# Patient Record
Sex: MALE | Race: WHITE | Employment: OTHER | URBAN - METROPOLITAN AREA
[De-identification: names, ages, dates, MRNs, and addresses within clinical notes are randomized per-mention and may not be internally consistent; named-entity substitution may affect disease eponyms.]

---

## 2016-12-01 PROCEDURE — G9678 ONCOLOGY CARE MODEL SERVICE: HCPCS | Performed by: INTERNAL MEDICINE

## 2017-01-01 PROCEDURE — G9678 ONCOLOGY CARE MODEL SERVICE: HCPCS | Performed by: INTERNAL MEDICINE

## 2017-01-17 ENCOUNTER — SNF/IP PROF CHARGE ONLY (OUTPATIENT)
Dept: HEMATOLOGY/ONCOLOGY | Facility: HOSPITAL | Age: 82
End: 2017-01-17

## 2017-01-17 DIAGNOSIS — Z09 CHEMOTHERAPY FOLLOW-UP EXAMINATION: Primary | ICD-10-CM

## 2017-01-25 ENCOUNTER — ANTI-COAG VISIT (OUTPATIENT)
Dept: INTERNAL MEDICINE CLINIC | Facility: CLINIC | Age: 82
End: 2017-01-25

## 2017-01-25 DIAGNOSIS — I48.91 ATRIAL FIBRILLATION, UNSPECIFIED TYPE (HCC): Primary | ICD-10-CM

## 2017-01-25 LAB — INR: 2 (ref 0.8–1.2)

## 2017-01-25 PROCEDURE — 36416 COLLJ CAPILLARY BLOOD SPEC: CPT

## 2017-01-25 PROCEDURE — 85610 PROTHROMBIN TIME: CPT

## 2017-01-25 PROCEDURE — G0463 HOSPITAL OUTPT CLINIC VISIT: HCPCS

## 2017-02-23 ENCOUNTER — ANTI-COAG VISIT (OUTPATIENT)
Dept: INTERNAL MEDICINE CLINIC | Facility: CLINIC | Age: 82
End: 2017-02-23

## 2017-02-23 DIAGNOSIS — I48.91 ATRIAL FIBRILLATION, UNSPECIFIED TYPE (HCC): Primary | ICD-10-CM

## 2017-02-23 LAB — INR: 1.6 (ref 0.8–1.2)

## 2017-02-23 PROCEDURE — 36416 COLLJ CAPILLARY BLOOD SPEC: CPT

## 2017-02-23 PROCEDURE — 85610 PROTHROMBIN TIME: CPT

## 2017-02-23 PROCEDURE — G0463 HOSPITAL OUTPT CLINIC VISIT: HCPCS

## 2017-03-02 ENCOUNTER — ANTI-COAG VISIT (OUTPATIENT)
Dept: INTERNAL MEDICINE CLINIC | Facility: CLINIC | Age: 82
End: 2017-03-02

## 2017-03-02 DIAGNOSIS — I48.91 ATRIAL FIBRILLATION, UNSPECIFIED TYPE (HCC): Primary | ICD-10-CM

## 2017-03-02 LAB — INR: 2.1 (ref 0.8–1.2)

## 2017-03-02 PROCEDURE — 85610 PROTHROMBIN TIME: CPT

## 2017-03-02 PROCEDURE — G0463 HOSPITAL OUTPT CLINIC VISIT: HCPCS

## 2017-03-02 PROCEDURE — 36416 COLLJ CAPILLARY BLOOD SPEC: CPT

## 2017-03-14 ENCOUNTER — OFFICE VISIT (OUTPATIENT)
Dept: OTOLARYNGOLOGY | Facility: CLINIC | Age: 82
End: 2017-03-14

## 2017-03-14 VITALS
DIASTOLIC BLOOD PRESSURE: 68 MMHG | TEMPERATURE: 97 F | BODY MASS INDEX: 24.38 KG/M2 | SYSTOLIC BLOOD PRESSURE: 123 MMHG | WEIGHT: 180 LBS | HEIGHT: 72 IN

## 2017-03-14 DIAGNOSIS — H61.23 BILATERAL IMPACTED CERUMEN: Primary | ICD-10-CM

## 2017-03-14 PROCEDURE — 69210 REMOVE IMPACTED EAR WAX UNI: CPT | Performed by: OTOLARYNGOLOGY

## 2017-03-14 PROCEDURE — 99203 OFFICE O/P NEW LOW 30 MIN: CPT | Performed by: OTOLARYNGOLOGY

## 2017-03-14 NOTE — PROGRESS NOTES
Malou Benson is a 80year old male. Patient presents with:  Ear Wax: both ears    HPI:   His hearing has been diminished for some time. He and his wife feel that is slowly getting worse.  He reports that he has a large amount of wax and his sugars frequent fever, chills, sweats, weight loss, weight gain  SKIN: denies any unusual skin lesions or rashes  RESPIRATORY: denies shortness of breath with exertion  NEURO: denies headaches    EXAM:   /68 mmHg  Temp(Src) 97 °F (36.1 °C) (Tympanic)  Ht 6' (1.829 m

## 2017-03-16 ENCOUNTER — ANTI-COAG VISIT (OUTPATIENT)
Dept: INTERNAL MEDICINE CLINIC | Facility: CLINIC | Age: 82
End: 2017-03-16

## 2017-03-16 DIAGNOSIS — I48.91 ATRIAL FIBRILLATION, UNSPECIFIED TYPE (HCC): Primary | ICD-10-CM

## 2017-03-16 LAB — INR: 2.3 (ref 0.8–1.2)

## 2017-03-16 PROCEDURE — 36416 COLLJ CAPILLARY BLOOD SPEC: CPT

## 2017-03-16 PROCEDURE — 85610 PROTHROMBIN TIME: CPT

## 2017-03-16 PROCEDURE — G0463 HOSPITAL OUTPT CLINIC VISIT: HCPCS

## 2017-03-20 ENCOUNTER — SNF/IP PROF CHARGE ONLY (OUTPATIENT)
Dept: HEMATOLOGY/ONCOLOGY | Facility: HOSPITAL | Age: 82
End: 2017-03-20

## 2017-03-20 DIAGNOSIS — Z09 CHEMOTHERAPY FOLLOW-UP EXAMINATION: Primary | ICD-10-CM

## 2017-04-11 ENCOUNTER — ANTI-COAG VISIT (OUTPATIENT)
Dept: INTERNAL MEDICINE CLINIC | Facility: CLINIC | Age: 82
End: 2017-04-11

## 2017-04-11 DIAGNOSIS — I48.91 ATRIAL FIBRILLATION, UNSPECIFIED TYPE (HCC): Primary | ICD-10-CM

## 2017-04-11 PROCEDURE — G0463 HOSPITAL OUTPT CLINIC VISIT: HCPCS

## 2017-04-11 PROCEDURE — 85610 PROTHROMBIN TIME: CPT

## 2017-04-11 PROCEDURE — 36416 COLLJ CAPILLARY BLOOD SPEC: CPT

## 2017-04-18 ENCOUNTER — LAB REQUISITION (OUTPATIENT)
Dept: LAB | Facility: HOSPITAL | Age: 82
End: 2017-04-18
Payer: MEDICARE

## 2017-04-18 DIAGNOSIS — Z01.89 ENCOUNTER FOR OTHER SPECIFIED SPECIAL EXAMINATIONS: ICD-10-CM

## 2017-04-18 PROCEDURE — 88305 TISSUE EXAM BY PATHOLOGIST: CPT | Performed by: SURGERY

## 2017-05-01 ENCOUNTER — OFFICE VISIT (OUTPATIENT)
Dept: INTERNAL MEDICINE CLINIC | Facility: CLINIC | Age: 82
End: 2017-05-01

## 2017-05-01 VITALS
WEIGHT: 192 LBS | DIASTOLIC BLOOD PRESSURE: 76 MMHG | BODY MASS INDEX: 26 KG/M2 | HEART RATE: 68 BPM | TEMPERATURE: 98 F | SYSTOLIC BLOOD PRESSURE: 120 MMHG

## 2017-05-01 DIAGNOSIS — C20 RECTAL CANCER (HCC): ICD-10-CM

## 2017-05-01 DIAGNOSIS — I26.99 OTHER PULMONARY EMBOLISM WITHOUT ACUTE COR PULMONALE, UNSPECIFIED CHRONICITY (HCC): ICD-10-CM

## 2017-05-01 DIAGNOSIS — N18.3 CKD (CHRONIC KIDNEY DISEASE), STAGE 3 (MODERATE): ICD-10-CM

## 2017-05-01 DIAGNOSIS — D50.8 OTHER IRON DEFICIENCY ANEMIA: ICD-10-CM

## 2017-05-01 DIAGNOSIS — R05.9 COUGH: Primary | ICD-10-CM

## 2017-05-01 PROCEDURE — 99214 OFFICE O/P EST MOD 30 MIN: CPT | Performed by: INTERNAL MEDICINE

## 2017-05-01 PROCEDURE — G0463 HOSPITAL OUTPT CLINIC VISIT: HCPCS | Performed by: INTERNAL MEDICINE

## 2017-05-01 RX ORDER — AZITHROMYCIN 250 MG/1
TABLET, FILM COATED ORAL
Qty: 6 TABLET | Refills: 0 | Status: SHIPPED | OUTPATIENT
Start: 2017-05-01 | End: 2017-06-22

## 2017-05-01 NOTE — PROGRESS NOTES
Allie Smith is a 80year old male.     HPI:   Patient presents with:  Cough: has had for about a week      81 y/o M with one week h/o +cough; no sputum; denies sinus or nasal congestion; denies post-nasal drip; no SOB; no F/C      HISTORY:  Past Medical H Take 1 tablet by mouth twice daily Disp: 1 capsule Rfl: 0       Allergies:  No Known Allergies              ROS:   Constitutional: no weight loss; no fatigue  Cardiovascular:  Negative for chest pain; negative palpitations  Respiratory:  Negative for cough later this month                   Orders This Visit:  No orders of the defined types were placed in this encounter.        Meds This Visit:    No prescriptions requested or ordered in this encounter    Imaging & Referrals:  None     5/1/2017  Sancho Borjas

## 2017-05-09 ENCOUNTER — ANTI-COAG VISIT (OUTPATIENT)
Dept: INTERNAL MEDICINE CLINIC | Facility: CLINIC | Age: 82
End: 2017-05-09

## 2017-05-09 DIAGNOSIS — I48.91 ATRIAL FIBRILLATION, UNSPECIFIED TYPE (HCC): Primary | ICD-10-CM

## 2017-05-09 PROCEDURE — G0463 HOSPITAL OUTPT CLINIC VISIT: HCPCS

## 2017-05-09 PROCEDURE — 85610 PROTHROMBIN TIME: CPT

## 2017-05-09 PROCEDURE — 36416 COLLJ CAPILLARY BLOOD SPEC: CPT

## 2017-05-30 ENCOUNTER — ANTI-COAG VISIT (OUTPATIENT)
Dept: INTERNAL MEDICINE CLINIC | Facility: CLINIC | Age: 82
End: 2017-05-30

## 2017-05-30 DIAGNOSIS — I48.91 ATRIAL FIBRILLATION, UNSPECIFIED TYPE (HCC): Primary | ICD-10-CM

## 2017-05-30 PROCEDURE — 36416 COLLJ CAPILLARY BLOOD SPEC: CPT

## 2017-05-30 PROCEDURE — G0463 HOSPITAL OUTPT CLINIC VISIT: HCPCS

## 2017-05-30 PROCEDURE — 85610 PROTHROMBIN TIME: CPT

## 2017-06-22 RX ORDER — WARFARIN SODIUM 2.5 MG/1
TABLET ORAL
Qty: 135 TABLET | Refills: 1 | Status: ON HOLD | OUTPATIENT
Start: 2017-06-22 | End: 2017-12-25

## 2017-07-07 ENCOUNTER — ANTI-COAG VISIT (OUTPATIENT)
Dept: INTERNAL MEDICINE CLINIC | Facility: CLINIC | Age: 82
End: 2017-07-07

## 2017-07-07 DIAGNOSIS — I48.91 ATRIAL FIBRILLATION, UNSPECIFIED TYPE (HCC): ICD-10-CM

## 2017-07-07 LAB
INR: 2.4 (ref 0.8–1.2)
TEST STRIP EXPIRATION DATE: ABNORMAL DATE

## 2017-07-07 PROCEDURE — G0463 HOSPITAL OUTPT CLINIC VISIT: HCPCS

## 2017-07-07 PROCEDURE — 85610 PROTHROMBIN TIME: CPT

## 2017-07-07 PROCEDURE — 36416 COLLJ CAPILLARY BLOOD SPEC: CPT

## 2017-08-02 ENCOUNTER — ANTI-COAG VISIT (OUTPATIENT)
Dept: INTERNAL MEDICINE CLINIC | Facility: CLINIC | Age: 82
End: 2017-08-02

## 2017-08-02 DIAGNOSIS — I48.91 ATRIAL FIBRILLATION, UNSPECIFIED TYPE (HCC): ICD-10-CM

## 2017-08-02 LAB
INR: 3.1 (ref 0.8–1.2)
TEST STRIP EXPIRATION DATE: ABNORMAL DATE

## 2017-08-02 PROCEDURE — 99211 OFF/OP EST MAY X REQ PHY/QHP: CPT | Performed by: INTERNAL MEDICINE

## 2017-08-02 PROCEDURE — 36416 COLLJ CAPILLARY BLOOD SPEC: CPT | Performed by: INTERNAL MEDICINE

## 2017-08-02 PROCEDURE — 85610 PROTHROMBIN TIME: CPT | Performed by: INTERNAL MEDICINE

## 2017-08-03 ENCOUNTER — ANTI-COAG VISIT (OUTPATIENT)
Dept: INTERNAL MEDICINE CLINIC | Facility: CLINIC | Age: 82
End: 2017-08-03

## 2017-08-03 DIAGNOSIS — I48.91 ATRIAL FIBRILLATION, UNSPECIFIED TYPE (HCC): ICD-10-CM

## 2017-08-03 LAB
INR: 2.5 (ref 0.8–1.2)
TEST STRIP EXPIRATION DATE: ABNORMAL DATE

## 2017-08-03 PROCEDURE — 99211 OFF/OP EST MAY X REQ PHY/QHP: CPT | Performed by: INTERNAL MEDICINE

## 2017-08-03 PROCEDURE — 85610 PROTHROMBIN TIME: CPT | Performed by: INTERNAL MEDICINE

## 2017-08-03 PROCEDURE — 36416 COLLJ CAPILLARY BLOOD SPEC: CPT | Performed by: INTERNAL MEDICINE

## 2017-08-16 ENCOUNTER — ANTI-COAG VISIT (OUTPATIENT)
Dept: INTERNAL MEDICINE CLINIC | Facility: CLINIC | Age: 82
End: 2017-08-16

## 2017-08-16 DIAGNOSIS — I48.91 ATRIAL FIBRILLATION, UNSPECIFIED TYPE (HCC): ICD-10-CM

## 2017-08-16 LAB
INR: 1.9 (ref 0.8–1.2)
TEST STRIP EXPIRATION DATE: ABNORMAL DATE

## 2017-08-16 PROCEDURE — 85610 PROTHROMBIN TIME: CPT | Performed by: INTERNAL MEDICINE

## 2017-08-16 PROCEDURE — 99211 OFF/OP EST MAY X REQ PHY/QHP: CPT | Performed by: INTERNAL MEDICINE

## 2017-08-16 PROCEDURE — 36416 COLLJ CAPILLARY BLOOD SPEC: CPT | Performed by: INTERNAL MEDICINE

## 2017-09-06 ENCOUNTER — ANTI-COAG VISIT (OUTPATIENT)
Dept: INTERNAL MEDICINE CLINIC | Facility: CLINIC | Age: 82
End: 2017-09-06

## 2017-09-06 DIAGNOSIS — I48.91 ATRIAL FIBRILLATION, UNSPECIFIED TYPE (HCC): ICD-10-CM

## 2017-09-06 LAB
INR: 2.8 (ref 0.8–1.2)
TEST STRIP EXPIRATION DATE: ABNORMAL DATE

## 2017-09-06 PROCEDURE — 85610 PROTHROMBIN TIME: CPT

## 2017-09-06 PROCEDURE — 36416 COLLJ CAPILLARY BLOOD SPEC: CPT

## 2017-09-06 PROCEDURE — G0463 HOSPITAL OUTPT CLINIC VISIT: HCPCS

## 2017-10-02 ENCOUNTER — OFFICE VISIT (OUTPATIENT)
Dept: INTERNAL MEDICINE CLINIC | Facility: CLINIC | Age: 82
End: 2017-10-02

## 2017-10-02 VITALS
WEIGHT: 191 LBS | DIASTOLIC BLOOD PRESSURE: 72 MMHG | TEMPERATURE: 98 F | SYSTOLIC BLOOD PRESSURE: 114 MMHG | HEART RATE: 70 BPM | OXYGEN SATURATION: 94 % | HEIGHT: 72 IN | BODY MASS INDEX: 25.87 KG/M2

## 2017-10-02 DIAGNOSIS — C20 RECTAL CANCER (HCC): ICD-10-CM

## 2017-10-02 DIAGNOSIS — D50.8 OTHER IRON DEFICIENCY ANEMIA: ICD-10-CM

## 2017-10-02 DIAGNOSIS — I26.99 OTHER PULMONARY EMBOLISM WITHOUT ACUTE COR PULMONALE, UNSPECIFIED CHRONICITY (HCC): ICD-10-CM

## 2017-10-02 DIAGNOSIS — M10.9 ACUTE GOUT OF LEFT ANKLE, UNSPECIFIED CAUSE: Primary | ICD-10-CM

## 2017-10-02 PROCEDURE — G0463 HOSPITAL OUTPT CLINIC VISIT: HCPCS | Performed by: INTERNAL MEDICINE

## 2017-10-02 PROCEDURE — 99214 OFFICE O/P EST MOD 30 MIN: CPT | Performed by: INTERNAL MEDICINE

## 2017-10-02 RX ORDER — PREDNISONE 10 MG/1
TABLET ORAL
Qty: 24 TABLET | Refills: 0 | Status: SHIPPED | OUTPATIENT
Start: 2017-10-02 | End: 2017-12-24 | Stop reason: ALTCHOICE

## 2017-10-02 NOTE — PROGRESS NOTES
Kyleigh Mcmahan is a 80year old male.     HPI:   Patient presents with:  Checkup: Swollen left ankle, started 2 days ago, Non-pitting      79 y/o M with 3 d h/o pain and swelling to left ankle; no trauma; no F/C; no CP; no SOB      HISTORY:  Past Medical His Disp:  Rfl:    Coenzyme Q10 (CO Q 10) 10 MG Oral Cap Take 1 tablet by mouth daily Disp: 1 capsule Rfl: 0   Multiple Vitamins-Minerals (OCUVITE EYE HEALTH FORMULA) Oral Cap Take 1 tablet by mouth twice daily Disp: 1 capsule Rfl: 0       Allergies:  No Known disease-- creat improved to 1.40.  7.  Anemia -- due in part to acute blood loss.  Hgb stable at 11.2 on 9/6/16  8.  Macular degeneration.  The patient takes PreserVision daily and is due to ocular injection by ophthalmologist Dr. Jonathan Quiroz later this month

## 2017-10-04 ENCOUNTER — ANTI-COAG VISIT (OUTPATIENT)
Dept: INTERNAL MEDICINE CLINIC | Facility: CLINIC | Age: 82
End: 2017-10-04

## 2017-10-04 DIAGNOSIS — I48.91 ATRIAL FIBRILLATION, UNSPECIFIED TYPE (HCC): ICD-10-CM

## 2017-10-04 PROCEDURE — 36416 COLLJ CAPILLARY BLOOD SPEC: CPT

## 2017-10-04 PROCEDURE — G0463 HOSPITAL OUTPT CLINIC VISIT: HCPCS

## 2017-10-04 PROCEDURE — 85610 PROTHROMBIN TIME: CPT

## 2017-10-13 ENCOUNTER — ANTI-COAG VISIT (OUTPATIENT)
Dept: INTERNAL MEDICINE CLINIC | Facility: CLINIC | Age: 82
End: 2017-10-13

## 2017-10-13 DIAGNOSIS — I48.91 ATRIAL FIBRILLATION, UNSPECIFIED TYPE (HCC): ICD-10-CM

## 2017-10-13 PROCEDURE — 36416 COLLJ CAPILLARY BLOOD SPEC: CPT | Performed by: INTERNAL MEDICINE

## 2017-10-13 PROCEDURE — 85610 PROTHROMBIN TIME: CPT | Performed by: INTERNAL MEDICINE

## 2017-10-13 PROCEDURE — G0463 HOSPITAL OUTPT CLINIC VISIT: HCPCS | Performed by: INTERNAL MEDICINE

## 2017-11-02 ENCOUNTER — ANTI-COAG VISIT (OUTPATIENT)
Dept: INTERNAL MEDICINE CLINIC | Facility: CLINIC | Age: 82
End: 2017-11-02

## 2017-11-02 DIAGNOSIS — I48.91 ATRIAL FIBRILLATION, UNSPECIFIED TYPE (HCC): ICD-10-CM

## 2017-11-02 PROCEDURE — G0463 HOSPITAL OUTPT CLINIC VISIT: HCPCS

## 2017-11-02 PROCEDURE — 36416 COLLJ CAPILLARY BLOOD SPEC: CPT

## 2017-11-02 PROCEDURE — 85610 PROTHROMBIN TIME: CPT

## 2017-11-10 ENCOUNTER — ANTI-COAG VISIT (OUTPATIENT)
Dept: INTERNAL MEDICINE CLINIC | Facility: CLINIC | Age: 82
End: 2017-11-10

## 2017-11-10 DIAGNOSIS — I48.91 ATRIAL FIBRILLATION, UNSPECIFIED TYPE (HCC): ICD-10-CM

## 2017-11-10 PROCEDURE — G0463 HOSPITAL OUTPT CLINIC VISIT: HCPCS

## 2017-11-10 PROCEDURE — 36416 COLLJ CAPILLARY BLOOD SPEC: CPT

## 2017-11-10 PROCEDURE — 85610 PROTHROMBIN TIME: CPT

## 2017-11-27 ENCOUNTER — ANTI-COAG VISIT (OUTPATIENT)
Dept: INTERNAL MEDICINE CLINIC | Facility: CLINIC | Age: 82
End: 2017-11-27

## 2017-11-27 DIAGNOSIS — I48.91 ATRIAL FIBRILLATION, UNSPECIFIED TYPE (HCC): ICD-10-CM

## 2017-11-27 PROCEDURE — 85610 PROTHROMBIN TIME: CPT

## 2017-11-27 PROCEDURE — 36416 COLLJ CAPILLARY BLOOD SPEC: CPT

## 2017-12-24 ENCOUNTER — HOSPITAL ENCOUNTER (INPATIENT)
Facility: HOSPITAL | Age: 82
LOS: 1 days | Discharge: HOME | DRG: 307 | End: 2017-12-25
Attending: EMERGENCY MEDICINE | Admitting: INTERNAL MEDICINE
Payer: MEDICARE

## 2017-12-24 ENCOUNTER — APPOINTMENT (OUTPATIENT)
Dept: GENERAL RADIOLOGY | Facility: HOSPITAL | Age: 82
DRG: 307 | End: 2017-12-24
Attending: EMERGENCY MEDICINE
Payer: MEDICARE

## 2017-12-24 ENCOUNTER — APPOINTMENT (OUTPATIENT)
Dept: CT IMAGING | Facility: HOSPITAL | Age: 82
DRG: 307 | End: 2017-12-24
Attending: EMERGENCY MEDICINE
Payer: MEDICARE

## 2017-12-24 DIAGNOSIS — R06.00 DYSPNEA ON EXERTION: Primary | ICD-10-CM

## 2017-12-24 DIAGNOSIS — R77.8 ELEVATED TROPONIN I LEVEL: ICD-10-CM

## 2017-12-24 PROBLEM — R06.09 DYSPNEA ON EXERTION: Status: ACTIVE | Noted: 2017-12-24

## 2017-12-24 PROBLEM — N18.30 CKD (CHRONIC KIDNEY DISEASE), STAGE III (HCC): Status: ACTIVE | Noted: 2017-12-24

## 2017-12-24 PROBLEM — R79.89 ELEVATED TROPONIN I LEVEL: Status: ACTIVE | Noted: 2017-12-24

## 2017-12-24 PROCEDURE — 71010 XR CHEST AP PORTABLE  (CPT=71010): CPT | Performed by: EMERGENCY MEDICINE

## 2017-12-24 PROCEDURE — 99223 1ST HOSP IP/OBS HIGH 75: CPT | Performed by: INTERNAL MEDICINE

## 2017-12-24 PROCEDURE — 70450 CT HEAD/BRAIN W/O DYE: CPT | Performed by: EMERGENCY MEDICINE

## 2017-12-24 RX ORDER — VITS A,C,E/LUTEIN/MINERALS 300MCG-200
1 TABLET ORAL 2 TIMES DAILY
Status: DISCONTINUED | OUTPATIENT
Start: 2017-12-24 | End: 2017-12-25

## 2017-12-24 RX ORDER — CHOLECALCIFEROL (VITAMIN D3) 125 MCG
1000 CAPSULE ORAL DAILY
Status: DISCONTINUED | OUTPATIENT
Start: 2017-12-24 | End: 2017-12-25

## 2017-12-24 RX ORDER — ASCORBIC ACID 1000 MG
10 TABLET ORAL DAILY
Status: DISCONTINUED | OUTPATIENT
Start: 2017-12-24 | End: 2017-12-24 | Stop reason: RX

## 2017-12-24 RX ORDER — VIT A/VIT C/VIT E/ZINC/COPPER 2148-113
1 TABLET ORAL 2 TIMES DAILY
COMMUNITY

## 2017-12-24 NOTE — PROGRESS NOTES
[facility] Progress Note:  Automatic Substitution    The order for Preservision AREDS has been changed to Ocuvite per the automatic substitution policy approved by the P&T committee.   Thank you,  Haritha See, PharmD  12/24/20

## 2017-12-24 NOTE — CONSULTS
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 Peripheral pulses are 2+. Neurologic: Alert and oriented, normal affect. Skin: Warm and dry.      Laboratory Data:    Lab Results  Component Value Date   WBC 9.3 12/24/2017   HGB 14.1 12/24/2017   HCT 42.8 12/24/2017   PLT 14 Recommendations:  Problem #1 dyspnea on exertion. Unclear etiology does not appear to be in congestive heart failure on the exam.  No need for diuretics at this point.   Does have what appears to be aortic stenosis murmur on exam which might be contrib

## 2017-12-24 NOTE — ED NOTES
Pt to ED per fall 2 days ago, pt slid down stairs hitting his lower back on the stairs and then hitting back of head on stairs. Pt denies abd pain/nausea but vomited this AM after taking coumadin. Pt then took another tab, now requesting an INR check.   Pt

## 2017-12-24 NOTE — ED INITIAL ASSESSMENT (HPI)
Pt fell down the stairs 2 days ago and yesterday he vomited after taking his coumadin. Pt's wife gave him extra coumadin, he is here to get his INR checked.

## 2017-12-24 NOTE — PROGRESS NOTES
Pharmacy Note: Herbal/OTC Supplement Discontinuation Per Policy    Co Q 10 & Total memory & Focus formula has been discontinued on [place] per policy. These supplements may be restarted upon discharge using the medication reconciliation process.     Fer Jose

## 2017-12-24 NOTE — HISTORICAL OFFICE NOTE
BRADEN ULLOA  [phone]  : 1926  ACCOUNT: [id number]  CARDIOLOGIST: Ángel Baker M.D.    Hospital: [facility]  Admitted: 2014  Discharged: 12/10/2014    Joana COURSE: The patient came in on 2014 for

## 2017-12-24 NOTE — ED PROVIDER NOTES
Patient Seen in: [facility] AND [facility] Emergency Department    History   Patient presents with:  Fall (musculoskeletal, neurologic)    Stated Complaint:     HPI    49-year-old male with history of prior DVT and PE presently taking Coumadin, chronic kidney di COLONOSCOPY  03-: ELECTROCARDIOGRAM, COMPLETE      Comment: Scanned to media tab - DOS 03-  1976: HERNIA SURGERY  2003: KNEE REPLACEMENT SURGERY Bilateral        Smoking status: Never Smoker following:        Result Value    PT 36.3 (*)     INR 3.7 (*)     All other components within normal limits   BASIC METABOLIC PANEL (8) - Abnormal; Notable for the following:     Glucose 133 (*)     BUN 34 (*)     Creatinine 1.69 (*)     BUN/CREA Ratio 20. complaints during his ED stay. He denies any symptoms while at rest.  Will admit for cardiac monitoring and further evaluation. Discussed with Dr. Alexandre Waters, the patient's primary physician. Also discussed with Dr. Teresa Pringle, cardiology.       Admission dis

## 2017-12-24 NOTE — H&P
[address] Ceja Patient Status:  Emergency    1926 MRN [id number]   Location [address] Attending Washington Grijalva MD   Hosp Day # 0 PCP Sinai Cohen MD     Date:  15 Coumadin. He converted to normal sinus rhythm on his own. 3.  Chronic kidney disease - the patient's creatinine ranging between 1.6 and 1.7.  4.  Macular degeneration. The patient is undergoing injections by ophthalmologist Dr. Dave Bales.   Harper Hiar Medical History:   Diagnosis Date   • Age-related macular degeneration OS 7/29/2014   • Age-related nuclear cataract of both eyes 7/29/2014   • Basal cell carcinoma 2010    right postauricular   • Basal cell carcinoma 2016    mid posterior neck   • Basal c Cancer Sister    • Cancer Sister    • Davion Lama Son      Blood clot in leg   • Glaucoma Neg    • Macular degeneration Neg    • Diabetes Neg              Review of Systems:    ROS:   Constitutional: no weight loss; no fatigue  ENMT:  Negative for 133 12/24/2017    12/24/2017   K 4.2 12/24/2017    12/24/2017   CO2 22 12/24/2017   BUN 34 12/24/2017   CREATSERUM 1.69 12/24/2017   CA 9.1 12/24/2017   INR 3.7 12/24/2017   TROP 0.13 12/24/2017       Recent Labs   Lab  12/24/17   0816   WBC  9 concurrent Xeloda/XRT in Sept 2016 under care of Nj Lopez, and oncologist Dr Zaki Ragsdale; pt had flex-sig on 4/18/17 by Dr Pablo Hester; biopsy negative for invasive carcinoma; overdue for repeat flex-sig since July 2017 for re-look sigmoidoscopy--> advised

## 2017-12-25 ENCOUNTER — APPOINTMENT (OUTPATIENT)
Dept: CV DIAGNOSTICS | Facility: HOSPITAL | Age: 82
DRG: 307 | End: 2017-12-25
Attending: INTERNAL MEDICINE
Payer: MEDICARE

## 2017-12-25 VITALS
HEIGHT: 72 IN | OXYGEN SATURATION: 95 % | SYSTOLIC BLOOD PRESSURE: 132 MMHG | TEMPERATURE: 98 F | DIASTOLIC BLOOD PRESSURE: 69 MMHG | WEIGHT: 183 LBS | BODY MASS INDEX: 24.79 KG/M2 | HEART RATE: 70 BPM | RESPIRATION RATE: 18 BRPM

## 2017-12-25 PROCEDURE — 93306 TTE W/DOPPLER COMPLETE: CPT | Performed by: INTERNAL MEDICINE

## 2017-12-25 PROCEDURE — 99232 SBSQ HOSP IP/OBS MODERATE 35: CPT | Performed by: INTERNAL MEDICINE

## 2017-12-25 RX ORDER — WARFARIN SODIUM 2.5 MG/1
TABLET ORAL
Qty: 135 TABLET | Refills: 1 | Status: SHIPPED | OUTPATIENT
Start: 2017-12-25 | End: 2018-10-13

## 2017-12-25 NOTE — PROGRESS NOTES
[facility]    Cardiology Progress Note    Leanna Meehan Patient Status:  Inpatient    1926 MRN [id number]   Location [facility] 3W/SW Attending Andrei Marin MD   Hosp Day # 1 PCP Dilip Grijalva MD     Patient Active P no cyanosis  Neuro: no focal deficits  Skin: no rashes or lesions      Scheduled Meds:   • cyanocobalamin  1,000 mcg Oral Daily   • OCUVITE-LUTEIN  1 tablet Oral BID   • vitamin E  200 Units Oral Daily   • metoprolol Tartrate  25 mg Oral 2x Daily(Beta Bloc 3. Demineralization. 4. Scoliosis. 5. Osteoarthritis. 6. Calcific bursitis right shoulder. 7. Small hiatal hernia.          Ekg 12-lead    Result Date: 12/24/2017  ECG Report  Interpretation  -------------------------- Sinus Rhythm -Right bundle branch bloc

## 2017-12-25 NOTE — PLAN OF CARE
Patient's spouse here. Discharge instructions explained to patient and spouse. [facility] closed today. Clarified with . Ok to give metoprolol early and discharge patient home.  Reinforced to spouse no coumadin today and INR to be checked in am. V

## 2017-12-25 NOTE — PROGRESS NOTES
[facility]    Progress Note    Holcomb Repress Patient Status:  Inpatient    1926 MRN [id number]   Location [facility] 3W/SW Attending Laura Martínez MD   Hosp Day # 1 PCP Sameera Coffman MD       SUBJECTIVE:  Feels fine Little change from December 7, 2014. 2. Cardiomegaly. 3. Demineralization. 4. Scoliosis. 5. Osteoarthritis. 6. Calcific bursitis right shoulder. 7. Small hiatal hernia.                    Meds:     Current Facility-Administered Medications:  Vitamin B-12 (V tomorrow.      Chronic kidney disease  creatinine near baseline at 1.71     Macular degeneration  The patient takes PreserVision daily and sees ophthalmologist Dr. Chinedu Young for intra-ocular injections    Subclinical hypothyroidism  Check FT4; TSH slightly

## 2017-12-26 ENCOUNTER — TELEPHONE (OUTPATIENT)
Dept: INTERNAL MEDICINE UNIT | Facility: HOSPITAL | Age: 82
End: 2017-12-26

## 2017-12-26 ENCOUNTER — PATIENT OUTREACH (OUTPATIENT)
Dept: CASE MANAGEMENT | Age: 82
End: 2017-12-26

## 2017-12-26 ENCOUNTER — TELEPHONE (OUTPATIENT)
Dept: INTERNAL MEDICINE CLINIC | Facility: CLINIC | Age: 82
End: 2017-12-26

## 2017-12-26 ENCOUNTER — ANTI-COAG VISIT (OUTPATIENT)
Dept: INTERNAL MEDICINE CLINIC | Facility: CLINIC | Age: 82
End: 2017-12-26

## 2017-12-26 DIAGNOSIS — I26.99 OTHER PULMONARY EMBOLISM WITHOUT ACUTE COR PULMONALE, UNSPECIFIED CHRONICITY (HCC): ICD-10-CM

## 2017-12-26 DIAGNOSIS — I48.91 ATRIAL FIBRILLATION, UNSPECIFIED TYPE (HCC): ICD-10-CM

## 2017-12-26 DIAGNOSIS — I48.20 CHRONIC ATRIAL FIBRILLATION (HCC): ICD-10-CM

## 2017-12-26 LAB — INR: 2.5 (ref 0.8–1.2)

## 2017-12-26 PROCEDURE — 36416 COLLJ CAPILLARY BLOOD SPEC: CPT | Performed by: INTERNAL MEDICINE

## 2017-12-26 PROCEDURE — 85610 PROTHROMBIN TIME: CPT | Performed by: INTERNAL MEDICINE

## 2017-12-26 NOTE — TELEPHONE ENCOUNTER
Dr. Jennie Love' message to pt's wife Magi Booker who verbalized understanding.   Magi Jhony states pt has appt with you on Thursday -  To [place], [place]

## 2017-12-26 NOTE — TELEPHONE ENCOUNTER
INR 2.5 today on fingerstick. INR was 3.6 yesterday -- has been holding coumadin for past 2 days. Currently on coumadin coumadin 3.75 mg po qHS on TTSS and 2.5 mg po qHS MWF. Let's have him change to 3.75mg on T, Th, Sun and 2.5mg on M,W,F,Sat.   Repeat

## 2017-12-26 NOTE — PROGRESS NOTES
Patient presented today with spouse for PT/INR check. Patient's full name and  verified. PT/INR checked per Dr. Christopher Ly Discharge note from 2017. Patient tolerated the procedure well.   Reminded patient and spouse to have a conversation with TAHMINA

## 2017-12-28 ENCOUNTER — ANTI-COAG VISIT (OUTPATIENT)
Dept: INTERNAL MEDICINE CLINIC | Facility: CLINIC | Age: 82
End: 2017-12-28

## 2017-12-28 ENCOUNTER — OFFICE VISIT (OUTPATIENT)
Dept: INTERNAL MEDICINE CLINIC | Facility: CLINIC | Age: 82
End: 2017-12-28

## 2017-12-28 VITALS
HEART RATE: 67 BPM | SYSTOLIC BLOOD PRESSURE: 108 MMHG | DIASTOLIC BLOOD PRESSURE: 74 MMHG | WEIGHT: 190 LBS | BODY MASS INDEX: 25.73 KG/M2 | OXYGEN SATURATION: 97 % | HEIGHT: 72 IN | TEMPERATURE: 98 F

## 2017-12-28 DIAGNOSIS — I48.91 ATRIAL FIBRILLATION, UNSPECIFIED TYPE (HCC): ICD-10-CM

## 2017-12-28 DIAGNOSIS — R94.31 ABNORMAL EKG: ICD-10-CM

## 2017-12-28 DIAGNOSIS — H35.30 MACULAR DEGENERATION, UNSPECIFIED LATERALITY, UNSPECIFIED TYPE: ICD-10-CM

## 2017-12-28 DIAGNOSIS — C20 RECTAL CANCER (HCC): ICD-10-CM

## 2017-12-28 DIAGNOSIS — I48.0 PAF (PAROXYSMAL ATRIAL FIBRILLATION) (HCC): ICD-10-CM

## 2017-12-28 DIAGNOSIS — R77.8 ELEVATED TROPONIN: ICD-10-CM

## 2017-12-28 DIAGNOSIS — I26.99 OTHER PULMONARY EMBOLISM WITHOUT ACUTE COR PULMONALE, UNSPECIFIED CHRONICITY (HCC): ICD-10-CM

## 2017-12-28 DIAGNOSIS — R06.00 DYSPNEA ON EXERTION: Primary | ICD-10-CM

## 2017-12-28 DIAGNOSIS — I35.0 NONRHEUMATIC AORTIC VALVE STENOSIS: ICD-10-CM

## 2017-12-28 DIAGNOSIS — N18.30 CKD (CHRONIC KIDNEY DISEASE), STAGE III (HCC): ICD-10-CM

## 2017-12-28 DIAGNOSIS — I27.20 PULMONARY HTN (HCC): ICD-10-CM

## 2017-12-28 PROCEDURE — 99496 TRANSJ CARE MGMT HIGH F2F 7D: CPT | Performed by: INTERNAL MEDICINE

## 2017-12-28 PROCEDURE — 85610 PROTHROMBIN TIME: CPT

## 2017-12-28 PROCEDURE — G0463 HOSPITAL OUTPT CLINIC VISIT: HCPCS

## 2017-12-28 PROCEDURE — 36416 COLLJ CAPILLARY BLOOD SPEC: CPT

## 2017-12-28 NOTE — PROGRESS NOTES
HPI:    Hallie Lewis is a 80year old male here today for hospital follow up.    He was discharged from Inpatient hospital, [facility]  to Home   Admission Date: 12/24/17   Discharge Date: 12/25/17  Hospital Discharge Diagnosis: dyspnea, abnormal EK exertion after walking 100 feet. He presented to The ER for further evaluation. EKG showed NSR, RBBB (old since 2014), and T wave inversion in inferior leads (III, AVF-old) and anterior leads (V3, and V4). Troponin was elevated x 2 sets at 0.13.   He has cancer. Patient was diagnosed by colonoscopy in April 2016 by gastroenterologist Dr. Holly Craft. The patient denied surgery. Patient's stage is T3 N0.   The patient underwent endoscopic ultrasound by Dr. Tarun Grossman on May 12, 2016 which showed tumor arisin current facility-administered medications on file prior to visit. HISTORY: reconciled and reviewed with patient  He  has a past medical history of Age-related macular degeneration OS (7/29/2014);  Age-related nuclear cataract of both eyes (7/29/2014); °F (36.4 °C) (Oral)   Ht 6' (1.829 m)   Wt 190 lb (86.2 kg)   SpO2 97%   BMI 25.77 kg/m²    GENERAL: well developed, well nourished, in no apparent distress  SKIN: no rashes, no suspicious lesions  HEENT: atraumatic, normocephalic, ears and throat are tremayne atherosclerosis in the anterior and posterior circulations.     Rectal cancer  Completed concurrent Xeloda/XRT in Sept 2016 under care of Jenelle Franco, and oncologist Dr Monica Beltrán; pt had flex-sig on 4/18/17 by Dr Christine Cummings; biopsy negative for invasive ca

## 2018-01-16 ENCOUNTER — ANTI-COAG VISIT (OUTPATIENT)
Dept: INTERNAL MEDICINE CLINIC | Facility: CLINIC | Age: 83
End: 2018-01-16

## 2018-01-16 DIAGNOSIS — I48.91 ATRIAL FIBRILLATION, UNSPECIFIED TYPE (HCC): ICD-10-CM

## 2018-01-16 LAB — INR: 5.4 (ref 0.8–1.2)

## 2018-01-16 PROCEDURE — G0463 HOSPITAL OUTPT CLINIC VISIT: HCPCS

## 2018-01-16 PROCEDURE — 36416 COLLJ CAPILLARY BLOOD SPEC: CPT

## 2018-01-16 PROCEDURE — 85610 PROTHROMBIN TIME: CPT

## 2018-01-19 ENCOUNTER — ANTI-COAG VISIT (OUTPATIENT)
Dept: INTERNAL MEDICINE CLINIC | Facility: CLINIC | Age: 83
End: 2018-01-19

## 2018-01-19 ENCOUNTER — TELEPHONE (OUTPATIENT)
Dept: INTERNAL MEDICINE CLINIC | Facility: CLINIC | Age: 83
End: 2018-01-19

## 2018-01-19 DIAGNOSIS — I48.91 ATRIAL FIBRILLATION, UNSPECIFIED TYPE (HCC): ICD-10-CM

## 2018-01-19 LAB — INR: 2.2 (ref 0.8–1.2)

## 2018-01-19 PROCEDURE — G0463 HOSPITAL OUTPT CLINIC VISIT: HCPCS

## 2018-01-19 PROCEDURE — 85610 PROTHROMBIN TIME: CPT

## 2018-01-19 PROCEDURE — 36416 COLLJ CAPILLARY BLOOD SPEC: CPT

## 2018-01-19 NOTE — TELEPHONE ENCOUNTER
Pt dropped parking placard form that requires completion by Dr Hiral Panchal  Pt asked that this be completed and then mailed to the  office on his behalf  Form placed on Dr Emerald León desdebby  Tasked to Dr Hiral Panchal

## 2018-01-29 ENCOUNTER — ANTI-COAG VISIT (OUTPATIENT)
Dept: INTERNAL MEDICINE CLINIC | Facility: CLINIC | Age: 83
End: 2018-01-29

## 2018-01-29 DIAGNOSIS — I48.91 ATRIAL FIBRILLATION, UNSPECIFIED TYPE (HCC): ICD-10-CM

## 2018-01-29 LAB
INR: 3.2 (ref 0.8–1.2)
TEST STRIP EXPIRATION DATE: ABNORMAL DATE

## 2018-01-29 PROCEDURE — G0463 HOSPITAL OUTPT CLINIC VISIT: HCPCS

## 2018-01-29 PROCEDURE — 36416 COLLJ CAPILLARY BLOOD SPEC: CPT

## 2018-01-29 PROCEDURE — 85610 PROTHROMBIN TIME: CPT

## 2018-02-15 ENCOUNTER — ANTI-COAG VISIT (OUTPATIENT)
Dept: INTERNAL MEDICINE CLINIC | Facility: CLINIC | Age: 83
End: 2018-02-15

## 2018-02-15 DIAGNOSIS — I48.91 ATRIAL FIBRILLATION, UNSPECIFIED TYPE (HCC): ICD-10-CM

## 2018-02-15 LAB
INR: 2.9 (ref 0.8–1.2)
TEST STRIP EXPIRATION DATE: ABNORMAL DATE

## 2018-02-15 PROCEDURE — 85610 PROTHROMBIN TIME: CPT

## 2018-02-15 PROCEDURE — G0463 HOSPITAL OUTPT CLINIC VISIT: HCPCS

## 2018-02-15 PROCEDURE — 36416 COLLJ CAPILLARY BLOOD SPEC: CPT

## 2018-03-15 ENCOUNTER — ANTI-COAG VISIT (OUTPATIENT)
Dept: INTERNAL MEDICINE CLINIC | Facility: CLINIC | Age: 83
End: 2018-03-15

## 2018-03-15 DIAGNOSIS — I48.91 ATRIAL FIBRILLATION, UNSPECIFIED TYPE (HCC): ICD-10-CM

## 2018-03-15 LAB
INR: 2.2 (ref 0.8–1.2)
TEST STRIP EXPIRATION DATE: ABNORMAL DATE

## 2018-03-15 PROCEDURE — 85610 PROTHROMBIN TIME: CPT

## 2018-03-15 PROCEDURE — 36416 COLLJ CAPILLARY BLOOD SPEC: CPT

## 2018-03-15 NOTE — TELEPHONE ENCOUNTER
Form was completed by  original mailed to VIMAL LÓPEZ [facility] copy mailed to Pt home and a copy is in scanning

## 2018-03-15 NOTE — TELEPHONE ENCOUNTER
Pt came in to see [employer] and asked about this Placard if this was ever done.  I gave the form to Shelley she will complete the form today and mail to  and a copy to pt

## 2018-03-20 ENCOUNTER — LAB REQUISITION (OUTPATIENT)
Dept: LAB | Facility: HOSPITAL | Age: 83
End: 2018-03-20
Payer: MEDICARE

## 2018-03-20 DIAGNOSIS — Z01.89 ENCOUNTER FOR OTHER SPECIFIED SPECIAL EXAMINATIONS: ICD-10-CM

## 2018-03-20 PROCEDURE — 88305 TISSUE EXAM BY PATHOLOGIST: CPT | Performed by: SURGERY

## 2018-04-12 ENCOUNTER — ANTI-COAG VISIT (OUTPATIENT)
Dept: INTERNAL MEDICINE CLINIC | Facility: CLINIC | Age: 83
End: 2018-04-12

## 2018-04-12 DIAGNOSIS — I48.91 ATRIAL FIBRILLATION, UNSPECIFIED TYPE (HCC): ICD-10-CM

## 2018-04-12 PROCEDURE — 36416 COLLJ CAPILLARY BLOOD SPEC: CPT

## 2018-04-12 PROCEDURE — 85610 PROTHROMBIN TIME: CPT

## 2018-04-25 ENCOUNTER — TELEPHONE (OUTPATIENT)
Dept: OPHTHALMOLOGY | Facility: CLINIC | Age: 83
End: 2018-04-25

## 2018-04-25 NOTE — TELEPHONE ENCOUNTER
Patient's wife called stating the this patient was seen today by Dr. Lazarus Bloom and that Dr. Lazarus Bloom had recommended left cataract surgery. I got the progress notes from Dr. Lazarus Bloom and reviewed them with Dr. Julia Nobles.   He said that the patient could eith

## 2018-05-14 ENCOUNTER — TELEPHONE (OUTPATIENT)
Dept: INTERNAL MEDICINE CLINIC | Facility: CLINIC | Age: 83
End: 2018-05-14

## 2018-05-14 ENCOUNTER — ANTI-COAG VISIT (OUTPATIENT)
Dept: INTERNAL MEDICINE CLINIC | Facility: CLINIC | Age: 83
End: 2018-05-14

## 2018-05-14 DIAGNOSIS — I48.91 ATRIAL FIBRILLATION, UNSPECIFIED TYPE (HCC): ICD-10-CM

## 2018-05-14 DIAGNOSIS — R26.9 GAIT ABNORMALITY: Primary | ICD-10-CM

## 2018-05-14 PROCEDURE — 36416 COLLJ CAPILLARY BLOOD SPEC: CPT

## 2018-05-14 PROCEDURE — 85610 PROTHROMBIN TIME: CPT

## 2018-05-14 NOTE — TELEPHONE ENCOUNTER
Attempted to reach patient. LMTCB    Is there a specific type of cane they are looking for? In [facility] the DME orders for cane specify between quad (4 leg base) or straight cane.

## 2018-05-14 NOTE — TELEPHONE ENCOUNTER
Please call pt and spouse  Wondering if they can receive an order for lee for pt use, pt wife thinks it would be a good idea to have and was told if they had an order it would be covered by insurance  Tasked to nursing to advise

## 2018-05-15 NOTE — TELEPHONE ENCOUNTER
Any order for DME (durable medical equipment) requires an office visit for Medicare to cover equipment; advise office visit if patient wishes to pursue order for quad cane; please call pt

## 2018-06-18 ENCOUNTER — LAB ENCOUNTER (OUTPATIENT)
Dept: LAB | Age: 83
End: 2018-06-18
Attending: INTERNAL MEDICINE
Payer: MEDICARE

## 2018-06-18 ENCOUNTER — ANTI-COAG VISIT (OUTPATIENT)
Dept: INTERNAL MEDICINE CLINIC | Facility: CLINIC | Age: 83
End: 2018-06-18

## 2018-06-18 ENCOUNTER — OFFICE VISIT (OUTPATIENT)
Dept: INTERNAL MEDICINE CLINIC | Facility: CLINIC | Age: 83
End: 2018-06-18

## 2018-06-18 VITALS
BODY MASS INDEX: 27.85 KG/M2 | HEIGHT: 69 IN | HEART RATE: 60 BPM | WEIGHT: 188 LBS | SYSTOLIC BLOOD PRESSURE: 122 MMHG | OXYGEN SATURATION: 97 % | DIASTOLIC BLOOD PRESSURE: 70 MMHG | TEMPERATURE: 98 F

## 2018-06-18 DIAGNOSIS — I48.91 ATRIAL FIBRILLATION, UNSPECIFIED TYPE (HCC): ICD-10-CM

## 2018-06-18 DIAGNOSIS — I48.0 PAF (PAROXYSMAL ATRIAL FIBRILLATION) (HCC): Primary | ICD-10-CM

## 2018-06-18 DIAGNOSIS — I26.99 OTHER PULMONARY EMBOLISM WITHOUT ACUTE COR PULMONALE, UNSPECIFIED CHRONICITY (HCC): ICD-10-CM

## 2018-06-18 DIAGNOSIS — D50.8 OTHER IRON DEFICIENCY ANEMIA: ICD-10-CM

## 2018-06-18 DIAGNOSIS — R26.9 GAIT ABNORMALITY: ICD-10-CM

## 2018-06-18 DIAGNOSIS — N18.30 CKD (CHRONIC KIDNEY DISEASE), STAGE III (HCC): ICD-10-CM

## 2018-06-18 DIAGNOSIS — C20 RECTAL CANCER (HCC): ICD-10-CM

## 2018-06-18 DIAGNOSIS — I27.20 PULMONARY HTN (HCC): ICD-10-CM

## 2018-06-18 DIAGNOSIS — I35.0 NONRHEUMATIC AORTIC VALVE STENOSIS: ICD-10-CM

## 2018-06-18 DIAGNOSIS — H35.30 MACULAR DEGENERATION, UNSPECIFIED LATERALITY, UNSPECIFIED TYPE: ICD-10-CM

## 2018-06-18 PROCEDURE — 85025 COMPLETE CBC W/AUTO DIFF WBC: CPT

## 2018-06-18 PROCEDURE — 36415 COLL VENOUS BLD VENIPUNCTURE: CPT

## 2018-06-18 PROCEDURE — 80048 BASIC METABOLIC PNL TOTAL CA: CPT

## 2018-06-18 PROCEDURE — G0463 HOSPITAL OUTPT CLINIC VISIT: HCPCS

## 2018-06-18 PROCEDURE — G0463 HOSPITAL OUTPT CLINIC VISIT: HCPCS | Performed by: INTERNAL MEDICINE

## 2018-06-18 PROCEDURE — 99215 OFFICE O/P EST HI 40 MIN: CPT | Performed by: INTERNAL MEDICINE

## 2018-06-18 PROCEDURE — 36416 COLLJ CAPILLARY BLOOD SPEC: CPT

## 2018-06-18 PROCEDURE — 85610 PROTHROMBIN TIME: CPT

## 2018-06-18 NOTE — TELEPHONE ENCOUNTER
Pt wife asked that the order for pt cane be placed now that pt has had office visit  Printed/tasked to Dr Chayo James

## 2018-06-18 NOTE — PROGRESS NOTES
Meka Lamar is a 80year old male. HPI:   Patient presents with: Follow - Up: 6 months visit.        81 y/o M with Rectal cancer here for F/U; Completed concurrent Xeloda/XRT in Sept 2016 under care of Rosalia Santo, and oncologist Dr Smiley Clinton; pt had TO 1 1/2 TABLETS(2.5 MG TO 3.75 MG) BY MOUTH EVERY NIGHT AS DIRECTED BY COUMADIN CLINIC. Do NOT take any coumadin on 12/25/17 Disp: 135 tablet Rfl: 1   metoprolol Tartrate 25 MG Oral Tab Take 1 tablet (25 mg total) by mouth 2x Daily(Beta Blocker).  Disp: 6 neck or groin  Cardiovascular: RRR, S1, S2, no S3 or murmur  Respiratory: lungs without crackles or wheezes  Abdomen: normoactive bowel sounds, soft, non-tender and non-distended  Extremities: no clubbing, cyanosis or edema  Vascular: no carotid bruits; DP baseline at 1.69; check CBC, BMP     Macular degeneration  The patient takes PreserVision daily and sees ophthalmologist Dr. Olivia Lamas for intra-ocular injections     Pseudophakia  Had cataract removed left eye in May 2018 by Dr Xochitl Clayton 4 mos    Ziggy Dodson

## 2018-07-17 ENCOUNTER — ANTI-COAG VISIT (OUTPATIENT)
Dept: INTERNAL MEDICINE CLINIC | Facility: CLINIC | Age: 83
End: 2018-07-17
Payer: MEDICARE

## 2018-07-17 DIAGNOSIS — I48.91 ATRIAL FIBRILLATION, UNSPECIFIED TYPE (HCC): ICD-10-CM

## 2018-07-17 LAB
INR: 2.6 (ref 0.8–1.2)
TEST STRIP EXPIRATION DATE: ABNORMAL DATE

## 2018-07-17 PROCEDURE — 36416 COLLJ CAPILLARY BLOOD SPEC: CPT

## 2018-07-17 PROCEDURE — 85610 PROTHROMBIN TIME: CPT

## 2018-08-16 ENCOUNTER — ANTI-COAG VISIT (OUTPATIENT)
Dept: INTERNAL MEDICINE CLINIC | Facility: CLINIC | Age: 83
End: 2018-08-16
Payer: MEDICARE

## 2018-08-16 DIAGNOSIS — I48.91 ATRIAL FIBRILLATION, UNSPECIFIED TYPE (HCC): ICD-10-CM

## 2018-08-16 LAB
INR: 2.9 (ref 0.8–1.2)
TEST STRIP EXPIRATION DATE: ABNORMAL DATE

## 2018-08-16 PROCEDURE — 36416 COLLJ CAPILLARY BLOOD SPEC: CPT

## 2018-08-16 PROCEDURE — G0463 HOSPITAL OUTPT CLINIC VISIT: HCPCS

## 2018-08-16 PROCEDURE — 85610 PROTHROMBIN TIME: CPT

## 2018-08-30 ENCOUNTER — TELEPHONE (OUTPATIENT)
Dept: INTERNAL MEDICINE CLINIC | Facility: CLINIC | Age: 83
End: 2018-08-30

## 2018-08-30 RX ORDER — PREDNISONE 10 MG/1
TABLET ORAL
Qty: 24 TABLET | Refills: 0 | Status: SHIPPED | OUTPATIENT
Start: 2018-08-30 | End: 2019-02-28

## 2018-08-30 NOTE — TELEPHONE ENCOUNTER
Pt. Is having a gout attack Curly Living would like something prescribed ph.   # [phone]    [facility] ph. # [phone]

## 2018-08-30 NOTE — TELEPHONE ENCOUNTER
To Dr. Poornima Blair - see below,  Onset: 3 day ago. R great toe - red and swollen. Pain level: 4/10. Pharmacy confirmed.

## 2018-08-30 NOTE — TELEPHONE ENCOUNTER
Imp- gout;  Rec- prednisone 30 mg po qD x4d, 20 mg po qD x4d, 10 mg po qD x4d; ERx sent; wife called

## 2018-09-10 ENCOUNTER — LAB REQUISITION (OUTPATIENT)
Dept: LAB | Facility: HOSPITAL | Age: 83
End: 2018-09-10
Payer: MEDICARE

## 2018-09-10 DIAGNOSIS — Z01.89 ENCOUNTER FOR OTHER SPECIFIED SPECIAL EXAMINATIONS: ICD-10-CM

## 2018-09-10 PROCEDURE — 88305 TISSUE EXAM BY PATHOLOGIST: CPT | Performed by: SURGERY

## 2018-09-18 ENCOUNTER — ANTI-COAG VISIT (OUTPATIENT)
Dept: INTERNAL MEDICINE CLINIC | Facility: CLINIC | Age: 83
End: 2018-09-18
Payer: MEDICARE

## 2018-09-18 DIAGNOSIS — I48.91 ATRIAL FIBRILLATION, UNSPECIFIED TYPE (HCC): ICD-10-CM

## 2018-09-18 LAB
INR: 3.2 (ref 0.8–1.2)
TEST STRIP EXPIRATION DATE: ABNORMAL DATE

## 2018-09-18 PROCEDURE — 85610 PROTHROMBIN TIME: CPT

## 2018-09-18 PROCEDURE — G0463 HOSPITAL OUTPT CLINIC VISIT: HCPCS

## 2018-09-18 PROCEDURE — 36416 COLLJ CAPILLARY BLOOD SPEC: CPT

## 2018-10-09 ENCOUNTER — ANTI-COAG VISIT (OUTPATIENT)
Dept: INTERNAL MEDICINE CLINIC | Facility: CLINIC | Age: 83
End: 2018-10-09
Payer: MEDICARE

## 2018-10-09 DIAGNOSIS — I48.91 ATRIAL FIBRILLATION, UNSPECIFIED TYPE (HCC): ICD-10-CM

## 2018-10-09 PROCEDURE — 36416 COLLJ CAPILLARY BLOOD SPEC: CPT

## 2018-10-09 PROCEDURE — 85610 PROTHROMBIN TIME: CPT

## 2018-10-13 RX ORDER — WARFARIN SODIUM 2.5 MG/1
TABLET ORAL
Qty: 135 TABLET | Refills: 0 | Status: SHIPPED | OUTPATIENT
Start: 2018-10-13 | End: 2019-04-13

## 2018-11-12 ENCOUNTER — ANTI-COAG VISIT (OUTPATIENT)
Dept: INTERNAL MEDICINE CLINIC | Facility: CLINIC | Age: 83
End: 2018-11-12
Payer: MEDICARE

## 2018-11-12 DIAGNOSIS — I48.91 ATRIAL FIBRILLATION, UNSPECIFIED TYPE (HCC): ICD-10-CM

## 2018-11-12 PROCEDURE — 36416 COLLJ CAPILLARY BLOOD SPEC: CPT

## 2018-11-12 PROCEDURE — 85610 PROTHROMBIN TIME: CPT

## 2018-12-17 ENCOUNTER — ANTI-COAG VISIT (OUTPATIENT)
Dept: INTERNAL MEDICINE CLINIC | Facility: CLINIC | Age: 83
End: 2018-12-17
Payer: MEDICARE

## 2018-12-17 DIAGNOSIS — I48.91 ATRIAL FIBRILLATION, UNSPECIFIED TYPE (HCC): ICD-10-CM

## 2018-12-17 PROCEDURE — 36416 COLLJ CAPILLARY BLOOD SPEC: CPT

## 2018-12-17 PROCEDURE — 85610 PROTHROMBIN TIME: CPT

## 2019-01-14 ENCOUNTER — ANTI-COAG VISIT (OUTPATIENT)
Dept: INTERNAL MEDICINE CLINIC | Facility: CLINIC | Age: 84
End: 2019-01-14
Payer: MEDICARE

## 2019-01-14 DIAGNOSIS — I48.91 ATRIAL FIBRILLATION, UNSPECIFIED TYPE (HCC): ICD-10-CM

## 2019-01-14 LAB
INR: 2.1 (ref 0.8–1.2)
TEST STRIP EXPIRATION DATE: ABNORMAL DATE

## 2019-01-14 PROCEDURE — 85610 PROTHROMBIN TIME: CPT

## 2019-01-14 PROCEDURE — 36416 COLLJ CAPILLARY BLOOD SPEC: CPT

## 2019-01-14 PROCEDURE — G0463 HOSPITAL OUTPT CLINIC VISIT: HCPCS

## 2019-02-11 ENCOUNTER — ANTI-COAG VISIT (OUTPATIENT)
Dept: INTERNAL MEDICINE CLINIC | Facility: CLINIC | Age: 84
End: 2019-02-11
Payer: MEDICARE

## 2019-02-11 DIAGNOSIS — I48.91 ATRIAL FIBRILLATION, UNSPECIFIED TYPE (HCC): ICD-10-CM

## 2019-02-11 LAB
INR: 3.1 (ref 0.8–1.2)
TEST STRIP EXPIRATION DATE: ABNORMAL DATE

## 2019-02-11 PROCEDURE — 85610 PROTHROMBIN TIME: CPT

## 2019-02-11 PROCEDURE — 36416 COLLJ CAPILLARY BLOOD SPEC: CPT

## 2019-02-11 PROCEDURE — G0463 HOSPITAL OUTPT CLINIC VISIT: HCPCS

## 2019-02-21 LAB — INR: 2.8 (ref 0.8–1.2)

## 2019-02-28 ENCOUNTER — OFFICE VISIT (OUTPATIENT)
Dept: INTERNAL MEDICINE CLINIC | Facility: CLINIC | Age: 84
End: 2019-02-28
Payer: MEDICARE

## 2019-02-28 ENCOUNTER — TELEPHONE (OUTPATIENT)
Dept: INTERNAL MEDICINE CLINIC | Facility: CLINIC | Age: 84
End: 2019-02-28

## 2019-02-28 VITALS
SYSTOLIC BLOOD PRESSURE: 140 MMHG | TEMPERATURE: 97 F | WEIGHT: 194.81 LBS | OXYGEN SATURATION: 96 % | HEIGHT: 69 IN | HEART RATE: 97 BPM | BODY MASS INDEX: 28.85 KG/M2 | DIASTOLIC BLOOD PRESSURE: 88 MMHG

## 2019-02-28 DIAGNOSIS — I35.0 NONRHEUMATIC AORTIC VALVE STENOSIS: ICD-10-CM

## 2019-02-28 DIAGNOSIS — R91.8 LUNG MASS: Primary | ICD-10-CM

## 2019-02-28 DIAGNOSIS — C20 RECTAL CANCER (HCC): ICD-10-CM

## 2019-02-28 DIAGNOSIS — I48.0 PAF (PAROXYSMAL ATRIAL FIBRILLATION) (HCC): ICD-10-CM

## 2019-02-28 DIAGNOSIS — Z86.718 HISTORY OF VENOUS THROMBOEMBOLISM: ICD-10-CM

## 2019-02-28 DIAGNOSIS — I48.91 ATRIAL FIBRILLATION, UNSPECIFIED TYPE (HCC): ICD-10-CM

## 2019-02-28 DIAGNOSIS — N18.30 CKD (CHRONIC KIDNEY DISEASE), STAGE III (HCC): ICD-10-CM

## 2019-02-28 DIAGNOSIS — Z79.01 LONG TERM (CURRENT) USE OF ANTICOAGULANTS: ICD-10-CM

## 2019-02-28 DIAGNOSIS — I27.20 PULMONARY HTN (HCC): ICD-10-CM

## 2019-02-28 LAB
INR: 2.6 (ref 0.8–1.2)
TEST STRIP EXPIRATION DATE: ABNORMAL DATE

## 2019-02-28 PROCEDURE — 1111F DSCHRG MED/CURRENT MED MERGE: CPT | Performed by: INTERNAL MEDICINE

## 2019-02-28 PROCEDURE — 85610 PROTHROMBIN TIME: CPT | Performed by: INTERNAL MEDICINE

## 2019-02-28 PROCEDURE — 93005 ELECTROCARDIOGRAM TRACING: CPT | Performed by: INTERNAL MEDICINE

## 2019-02-28 PROCEDURE — 99215 OFFICE O/P EST HI 40 MIN: CPT | Performed by: INTERNAL MEDICINE

## 2019-02-28 PROCEDURE — G0463 HOSPITAL OUTPT CLINIC VISIT: HCPCS | Performed by: INTERNAL MEDICINE

## 2019-02-28 PROCEDURE — 93010 ELECTROCARDIOGRAM REPORT: CPT | Performed by: INTERNAL MEDICINE

## 2019-02-28 PROCEDURE — 36416 COLLJ CAPILLARY BLOOD SPEC: CPT | Performed by: INTERNAL MEDICINE

## 2019-02-28 NOTE — TELEPHONE ENCOUNTER
Samaria from the [facility] called  Requesting any records pt may have brought in from [facility] -  re: the lung nodule     Please send to fax# [phone] Attn: Ben Novak or Destini Damir

## 2019-03-01 NOTE — TELEPHONE ENCOUNTER
Office note and all notes attached faxed to [facility] or Elda at [phone] confirmation received and given back to DR. WEEMS

## 2019-03-01 NOTE — PROGRESS NOTES
Mary Carmen Sharif is a 80year old male. HPI:   Patient presents with:  Hospital F/U     Patient is here for follow-up after having been at [facility] in [place]. He was admitted for February 22nd 2019 overnight.     His family states reviewed. These will be faxed to oncology. View of labs showed white count 6.3. Hemoglobin 11.3. Crit 33.9. Platelets are 910,565. There was 2.8 on February 21. Urinalysis was negative. Electrolytes showed BUN 30. Calcium 8.4. Creatinine 1.39.   G medical management   • Rectal cancer [facility])     Biopsy of Rectal mass:  Invasive moderately differentiated adenocarcinoma in background of tubular adenoma        Social History:  Social History    Tobacco Use      Smoking status: Never Smoker      [facility].  - CBC WITH DIFFERENTIAL WITH PLATELET; Future  - COMP METABOLIC PANEL (14); Future  - TSH W REFLEX TO FREE T4; Future    2. PAF (paroxysmal atrial fibrillation) (HCC)  On Coumadin. INR will be checked today.   - ELECTROCARDIOGRAM, COMPLETE  - TS

## 2019-03-01 NOTE — TELEPHONE ENCOUNTER
Please fax my office visit note from today along with records from [facility] that I placed in out box to Rafi Dunbar or Elda. Then return to me. Thank you.

## 2019-03-04 NOTE — TELEPHONE ENCOUNTER
They received a hospital discharge summary on this patient. It is not their patient & they will shred what they received. Needs to be redone correctly.

## 2019-03-04 NOTE — TELEPHONE ENCOUNTER
Pt has scheduled OV with Dr Joaquin Che on 3/6/19 for Dx- lung mass; please call Frances De La Torre from [facility]

## 2019-03-06 ENCOUNTER — OFFICE VISIT (OUTPATIENT)
Dept: HEMATOLOGY/ONCOLOGY | Facility: HOSPITAL | Age: 84
End: 2019-03-06
Attending: INTERNAL MEDICINE
Payer: MEDICARE

## 2019-03-06 VITALS
WEIGHT: 187.38 LBS | HEIGHT: 72 IN | SYSTOLIC BLOOD PRESSURE: 133 MMHG | RESPIRATION RATE: 18 BRPM | DIASTOLIC BLOOD PRESSURE: 70 MMHG | BODY MASS INDEX: 25.38 KG/M2 | HEART RATE: 68 BPM

## 2019-03-06 DIAGNOSIS — C20 RECTAL CANCER (HCC): ICD-10-CM

## 2019-03-06 DIAGNOSIS — R91.8 MASS OF RIGHT LUNG: Primary | ICD-10-CM

## 2019-03-06 PROCEDURE — 99215 OFFICE O/P EST HI 40 MIN: CPT | Performed by: INTERNAL MEDICINE

## 2019-03-06 NOTE — CONSULTS
[facility]    PATIENT'S NAME: BRADEN ULLOA   CONSULTING PHYSICIAN: Mendel Lima.  Victor Hugo Berry MD   PATIENT ACCOUNT #: [de-identified] LOCATION: [address] [place] [address] RECORD #: [id number] YOB: 1926   CONSULTATION DATE: 03/06/2019       CANCER [facility] status is 0. Temperature is unavailable, pulse 68, respiratory rate 18, blood pressure 133/70. Weight is 85 kg. HEENT:  Moist mucous membranes. Oropharynx clear. NECK:  Supple. LUNGS:  Symmetric expansion. Nonlabored breathing.   HEART:  Good distal

## 2019-03-14 ENCOUNTER — HOSPITAL ENCOUNTER (OUTPATIENT)
Dept: NUCLEAR MEDICINE | Facility: HOSPITAL | Age: 84
Discharge: HOME | End: 2019-03-14
Attending: INTERNAL MEDICINE
Payer: MEDICARE

## 2019-03-14 ENCOUNTER — APPOINTMENT (OUTPATIENT)
Dept: LAB | Facility: HOSPITAL | Age: 84
End: 2019-03-14
Attending: INTERNAL MEDICINE
Payer: MEDICARE

## 2019-03-14 DIAGNOSIS — R91.8 MASS OF RIGHT LUNG: ICD-10-CM

## 2019-03-14 DIAGNOSIS — I48.91 ATRIAL FIBRILLATION, UNSPECIFIED TYPE (HCC): ICD-10-CM

## 2019-03-14 DIAGNOSIS — C20 RECTAL CANCER (HCC): ICD-10-CM

## 2019-03-14 LAB
CEA SERPL-MCNC: 7.6 NG/ML (ref ?–5)
GLUCOSE BLDC GLUCOMTR-MCNC: 108 MG/DL (ref 70–99)

## 2019-03-14 PROCEDURE — 78815 PET IMAGE W/CT SKULL-THIGH: CPT | Performed by: INTERNAL MEDICINE

## 2019-03-14 PROCEDURE — 82378 CARCINOEMBRYONIC ANTIGEN: CPT

## 2019-03-14 PROCEDURE — 36415 COLL VENOUS BLD VENIPUNCTURE: CPT

## 2019-03-14 PROCEDURE — 82962 GLUCOSE BLOOD TEST: CPT

## 2019-03-15 ENCOUNTER — OFFICE VISIT (OUTPATIENT)
Dept: HEMATOLOGY/ONCOLOGY | Facility: HOSPITAL | Age: 84
End: 2019-03-15
Attending: INTERNAL MEDICINE
Payer: MEDICARE

## 2019-03-15 ENCOUNTER — TELEPHONE (OUTPATIENT)
Dept: HEMATOLOGY/ONCOLOGY | Facility: HOSPITAL | Age: 84
End: 2019-03-15

## 2019-03-15 VITALS
BODY MASS INDEX: 25.89 KG/M2 | SYSTOLIC BLOOD PRESSURE: 110 MMHG | TEMPERATURE: 97 F | HEIGHT: 72 IN | HEART RATE: 64 BPM | DIASTOLIC BLOOD PRESSURE: 47 MMHG | WEIGHT: 191.13 LBS | OXYGEN SATURATION: 95 % | RESPIRATION RATE: 18 BRPM

## 2019-03-15 DIAGNOSIS — C20 RECTAL CANCER (HCC): ICD-10-CM

## 2019-03-15 DIAGNOSIS — R91.8 MASS OF RIGHT LUNG: Primary | ICD-10-CM

## 2019-03-15 PROCEDURE — 99214 OFFICE O/P EST MOD 30 MIN: CPT | Performed by: INTERNAL MEDICINE

## 2019-03-15 NOTE — TELEPHONE ENCOUNTER
Gail Callejas with New PFT date of 3/22 at 100pm with arrival at 1245 to blue parking lot to [facility].

## 2019-03-15 NOTE — PROGRESS NOTES
Cancer Center Progress Note    Patient Name: Torey Carter   YOB: 1926   Medical Record Number: [id number]   Attending Physician: Diane Kramer M.D.      Chief Complaint:  Rectal cancer, right lung mass    History of Present Illness:  Cancer his [facility]) 2013    medical management   • Rectal cancer [facility])     Biopsy of Rectal mass:  Invasive moderately differentiated adenocarcinoma in background of tubular adenoma         Past Surgical History:  Past Surgical History:   Procedure Laterality Date   • C Emotionally abused: Not on file        Physically abused: Not on file        Forced sexual activity: Not on file    Other Topics      Concerns:         Service: Not Asked        Blood Transfusions: Not Asked        Caffeine Concern:  Yes Physical Examination:  General: Patient is alert and oriented x 3, not in acute distress. Psych:  Mood and affect appropriate  HEENT: EOMs intact. PERRL. Oropharynx is clear. Neck: No JVD. No palpable lymphadenopathy. Neck is supple. Lymphatics:  William Root endoscopic evaluation. His performance status is good. We suspect the lung mass is a metastatic rectal lesion however how aggressive we proceed will depend on whether or not he also has a local recurrence (SBRT versus resection of lung mass).      The pat

## 2019-03-15 NOTE — TELEPHONE ENCOUNTER
Addendum to original entry documentation:    Flex sig scheduled for 3/20/19 and PFT to be rescheduled from 3/20/19.

## 2019-03-15 NOTE — TELEPHONE ENCOUNTER
Rita Uribe, PFT scheduled for Wednesday 3/20/19 at 0915 with arrival at 0900, blue parking lot to [facility], no inhalers 4 hours prior to testing. She verbalizes understanding.

## 2019-03-15 NOTE — TELEPHONE ENCOUNTER
Spoke to patient's wife and confirmed a flex sig procedure with Dr. Pablo Hester for patient on Wed, 3/20/10 early AM at [address], sometime after 8:15am is now scheduled.   Pt will hear back from Keron Lugo in Dr. Abimbola Roque ofc today with all ne

## 2019-03-20 ENCOUNTER — LAB REQUISITION (OUTPATIENT)
Dept: LAB | Facility: HOSPITAL | Age: 84
End: 2019-03-20
Payer: MEDICARE

## 2019-03-20 DIAGNOSIS — Z01.89 ENCOUNTER FOR OTHER SPECIFIED SPECIAL EXAMINATIONS: ICD-10-CM

## 2019-03-20 PROCEDURE — 88305 TISSUE EXAM BY PATHOLOGIST: CPT | Performed by: SURGERY

## 2019-03-22 ENCOUNTER — HOSPITAL ENCOUNTER (OUTPATIENT)
Dept: RESPIRATORY THERAPY | Facility: HOSPITAL | Age: 84
Discharge: HOME | End: 2019-03-22
Attending: INTERNAL MEDICINE
Payer: MEDICARE

## 2019-03-22 DIAGNOSIS — R91.8 MASS OF RIGHT LUNG: ICD-10-CM

## 2019-03-22 PROCEDURE — 94729 DIFFUSING CAPACITY: CPT

## 2019-03-22 PROCEDURE — 94010 BREATHING CAPACITY TEST: CPT

## 2019-03-22 PROCEDURE — 94726 PLETHYSMOGRAPHY LUNG VOLUMES: CPT

## 2019-03-22 NOTE — PROCEDURES
Pulmonary Function Test     IMPRESSION:  There is a mild restriction present. There is a moderate reduction in DLCO. Clinical correlation advised.

## 2019-03-27 ENCOUNTER — OFFICE VISIT (OUTPATIENT)
Dept: HEMATOLOGY/ONCOLOGY | Facility: HOSPITAL | Age: 84
End: 2019-03-27
Attending: INTERNAL MEDICINE
Payer: MEDICARE

## 2019-03-27 VITALS
HEART RATE: 68 BPM | RESPIRATION RATE: 18 BRPM | HEIGHT: 72 IN | TEMPERATURE: 97 F | BODY MASS INDEX: 26.1 KG/M2 | WEIGHT: 192.69 LBS | SYSTOLIC BLOOD PRESSURE: 97 MMHG | DIASTOLIC BLOOD PRESSURE: 56 MMHG

## 2019-03-27 DIAGNOSIS — R91.8 MASS OF RIGHT LUNG: Primary | ICD-10-CM

## 2019-03-27 DIAGNOSIS — C20 RECTAL CANCER (HCC): ICD-10-CM

## 2019-03-27 PROCEDURE — 99213 OFFICE O/P EST LOW 20 MIN: CPT | Performed by: INTERNAL MEDICINE

## 2019-03-27 NOTE — PROGRESS NOTES
Cancer Center Progress Note    Patient Name: Hallie Lewis   YOB: 1926   Medical Record Number: [id number]   Attending Physician: Nicholas Christianson M.D.      Chief Complaint:  Rectal cancer, right lung mass    History of Present Illness:  Cancer his middle finger   • Lipid screening 05-   • Pulmonary embolus [facility]) 2013    medical management   • Rectal cancer [facility])     Biopsy of Rectal mass:  Invasive moderately differentiated adenocarcinoma in background of tubular adenoma         Past Surgical partner violence:        Fear of current or ex partner: Not on file        Emotionally abused: Not on file        Physically abused: Not on file        Forced sexual activity: Not on file    Other Topics      Concerns:         Service: Not Asked m (6')   Wt 87.4 kg (192 lb 10.9 oz)   BMI 26.13 kg/m²     Physical Examination:  General: Patient is alert and oriented x 3, not in acute distress. Psych:  Mood and affect appropriate  HEENT: EOMs intact. PERRL. Oropharynx is clear. Neck: No JVD.  No pa negative for malignancy. Will review his lung lesion at thoracic tumor conference to consider resection versus SBRT    The patient's emotional well being was assessed and resources were discussed.   Appropriate resources were reviewed and discussed with antonieta

## 2019-03-28 ENCOUNTER — ANTI-COAG VISIT (OUTPATIENT)
Dept: INTERNAL MEDICINE CLINIC | Facility: CLINIC | Age: 84
End: 2019-03-28
Payer: MEDICARE

## 2019-03-28 DIAGNOSIS — I48.91 ATRIAL FIBRILLATION, UNSPECIFIED TYPE (HCC): ICD-10-CM

## 2019-03-28 LAB
INR: 2.3 (ref 0.8–1.2)
TEST STRIP EXPIRATION DATE: ABNORMAL DATE

## 2019-03-28 PROCEDURE — 36416 COLLJ CAPILLARY BLOOD SPEC: CPT

## 2019-03-28 PROCEDURE — 85610 PROTHROMBIN TIME: CPT

## 2019-03-29 ENCOUNTER — TELEPHONE (OUTPATIENT)
Dept: HEMATOLOGY/ONCOLOGY | Facility: HOSPITAL | Age: 84
End: 2019-03-29

## 2019-03-29 NOTE — TELEPHONE ENCOUNTER
Called Olga thomas LM that Dr Gilford Gauze is meeting with the other physicians on Tuesday, and at the meeting her 's situation will be discussed, and she will get a call from us Tuesday afternoon w next steps.   Asked her to please call if any additional que

## 2019-03-29 NOTE — TELEPHONE ENCOUNTER
The wife of the patient is calling asking about a Thoracic surgery appointment that was supposed to be made for her  by Tuesday she said she hasn't heard anything so she is calling back, Please advise

## 2019-04-01 ENCOUNTER — APPOINTMENT (OUTPATIENT)
Dept: RADIATION ONCOLOGY | Facility: HOSPITAL | Age: 84
End: 2019-04-01
Attending: RADIOLOGY
Payer: MEDICARE

## 2019-04-02 ENCOUNTER — TELEPHONE (OUTPATIENT)
Dept: HEMATOLOGY/ONCOLOGY | Facility: HOSPITAL | Age: 84
End: 2019-04-02

## 2019-04-02 NOTE — TELEPHONE ENCOUNTER
Called and spoke with [facility], discussion at tumor board to see Thoracic for a consult, message left with [facility] for an appointment time and will call patient back with time and place.

## 2019-04-04 ENCOUNTER — TELEPHONE (OUTPATIENT)
Dept: HEMATOLOGY/ONCOLOGY | Facility: HOSPITAL | Age: 84
End: 2019-04-04

## 2019-04-04 NOTE — TELEPHONE ENCOUNTER
Dale Ramos added another appointment day and gave Scott Givens an appt for 4/12/19 at 0830 here at the [facility] understanding.

## 2019-04-12 ENCOUNTER — OFFICE VISIT (OUTPATIENT)
Dept: HEMATOLOGY/ONCOLOGY | Facility: HOSPITAL | Age: 84
End: 2019-04-12
Attending: THORACIC SURGERY (CARDIOTHORACIC VASCULAR SURGERY)
Payer: MEDICARE

## 2019-04-12 VITALS
RESPIRATION RATE: 18 BRPM | OXYGEN SATURATION: 94 % | BODY MASS INDEX: 26.14 KG/M2 | DIASTOLIC BLOOD PRESSURE: 70 MMHG | HEART RATE: 66 BPM | WEIGHT: 193 LBS | HEIGHT: 72 IN | SYSTOLIC BLOOD PRESSURE: 114 MMHG

## 2019-04-12 NOTE — H&P
Thoracic Surgery Consult    Reason for Consultation: RUL nodule    Consulting Physician: Ole    Chief Complaint: \"spot in lung\"    History of Present Illness: Patient is a 80year old, male with PMHx uT3N0 rectal cancer s/p chemo/RT 2016 and DVT/PE 2013 2016    right posterior neck   • Basal cell carcinoma 2016    right posterior auricular   • DVT, lower extremity ([facility] [address]     LLE   • Gout 2014    right hand middle finger   • Lipid screening 05-   • Pulmonary embolus [facility]) 2013    medical manag lower extremity edema. Lungs: Normal respiratory effort. Clear to ascultation bilaterally. Abdomen: Soft, Non-tender, non-distended. No hepatosplenomegaly noted. Extremities: No clubbing or cyanosis.  No lateralizing weakness  Neuro: No gross cranial ner would likely need a lobectomy. His PFTs are borderline and patient may be SOB and need oxygen after surgery. Discussed risks and benefits of surgical resection with patient, and it was determined that SBRT would be a better option for patient.     Plan:  -r

## 2019-04-13 RX ORDER — WARFARIN SODIUM 2.5 MG/1
TABLET ORAL
Qty: 135 TABLET | Refills: 0 | Status: SHIPPED | OUTPATIENT
Start: 2019-04-13 | End: 2019-07-18

## 2019-04-16 ENCOUNTER — OFFICE VISIT (OUTPATIENT)
Dept: RADIATION ONCOLOGY | Facility: HOSPITAL | Age: 84
End: 2019-04-16
Attending: RADIOLOGY
Payer: MEDICARE

## 2019-04-16 VITALS
TEMPERATURE: 98 F | SYSTOLIC BLOOD PRESSURE: 136 MMHG | BODY MASS INDEX: 26 KG/M2 | DIASTOLIC BLOOD PRESSURE: 94 MMHG | HEART RATE: 59 BPM | RESPIRATION RATE: 18 BRPM | WEIGHT: 194.19 LBS | OXYGEN SATURATION: 98 %

## 2019-04-16 DIAGNOSIS — R91.8 MASS OF UPPER LOBE OF RIGHT LUNG: Primary | ICD-10-CM

## 2019-04-16 PROCEDURE — 99212 OFFICE O/P EST SF 10 MIN: CPT

## 2019-04-16 RX ORDER — MULTIVIT-MIN/IRON/FOLIC ACID/K 18-600-40
CAPSULE ORAL
COMMUNITY
End: 2021-01-01

## 2019-04-16 NOTE — PROGRESS NOTES
Nursing Consultation Note  Patient: Kyleigh Mcmahan  YOB: 1926  Age: 80year old  Radiation Oncologist: Dr. Shital Gregg  Referring Physician: Jessica Mcgowan  Diagnosis:No diagnosis found.   Consult Date: 4/16/2019      Chemotherapy: yes  La mouth 2 (two) times daily. Disp:  Rfl:    Misc Natural Products (TOTAL MEMORY & FOCUS FORMULA) Oral Tab Take 1 tablet by mouth daily. Disp:  Rfl:    cyanocobalamin 1000 MCG Oral Tab Take 1 tablet (1,000 mcg total) by mouth daily.  Disp: 1 tablet Rfl: 0   vi Social Needs      Financial resource strain: Not on file      Food insecurity:        Worry: Not on file        Inability: Not on file      Transportation needs:        Medical: Not on file        Non-medical: Not on file    Tobacco Use      Smoking status work activities. Up and about more than 50% of waking hours. Education:  yes    Are ADL's met? Yes  Does patient feel safe in their environment? Yes  Care decisions:  Patient and/or surrogate IS involved in care decisions.   Advanced directives:  Cecilia Sears disability:  yes  Dentures:  no       Pt came in for new lung nodule consult. Medical history reviewed. Pt had been treated in 2016 by Dr. Mariela Franco for rectal cancer. Medications reviewed. Pt vss.   Unsteady gait noted, I spoke to patient about using his cane

## 2019-04-18 NOTE — CONSULTS
[facility]    PATIENT'S NAME: Gillian Novak   RADIATION ONCOLOGIST: Stanton Cummings.  Brody Ni MD   PATIENT ACCOUNT #: [de-identified] LOCATION: [address] [place] [address] RECORD #: [id number] YOB: 1926   CONSULTATION DATE: 04/16/2019       RADIA discussed at our Multidisciplinary Thoracic Oncology Clinic. Though a nonsmoker, the clinical appearance in the lung was highly suspicious for malignancy, and appeared likely to represent a primary bronchogenic non-small-cell carcinoma of the lung.   The p rate of 18, and a temperature of 97.6. He has a pain score of 0 and a weight of 194 pounds. NECK:  Neck is supple with no lymphadenopathy. LUNGS:  Clear to auscultation bilaterally.   HEART:  Regular rate and rhythm with a normal S1, S2, and no audible m effects of therapy. I told him that he can experience some fatigue and may have some minor skin changes on his chest and back. There may be some increased shortness of breath or cough that could occur as a result of therapy.   I do not expect any of these

## 2019-04-22 ENCOUNTER — APPOINTMENT (OUTPATIENT)
Dept: RADIATION ONCOLOGY | Facility: HOSPITAL | Age: 84
End: 2019-04-22
Attending: RADIOLOGY
Payer: MEDICARE

## 2019-04-22 PROCEDURE — 77470 SPECIAL RADIATION TREATMENT: CPT | Performed by: RADIOLOGY

## 2019-04-22 PROCEDURE — 77290 THER RAD SIMULAJ FIELD CPLX: CPT | Performed by: RADIOLOGY

## 2019-04-22 PROCEDURE — 77334 RADIATION TREATMENT AID(S): CPT | Performed by: RADIOLOGY

## 2019-04-24 ENCOUNTER — APPOINTMENT (OUTPATIENT)
Dept: HEMATOLOGY/ONCOLOGY | Facility: HOSPITAL | Age: 84
End: 2019-04-24
Attending: THORACIC SURGERY (CARDIOTHORACIC VASCULAR SURGERY)
Payer: MEDICARE

## 2019-04-30 ENCOUNTER — ANTI-COAG VISIT (OUTPATIENT)
Dept: INTERNAL MEDICINE CLINIC | Facility: CLINIC | Age: 84
End: 2019-04-30
Payer: MEDICARE

## 2019-04-30 DIAGNOSIS — I48.91 ATRIAL FIBRILLATION, UNSPECIFIED TYPE (HCC): ICD-10-CM

## 2019-04-30 PROCEDURE — 77293 RESPIRATOR MOTION MGMT SIMUL: CPT | Performed by: RADIOLOGY

## 2019-04-30 PROCEDURE — 36416 COLLJ CAPILLARY BLOOD SPEC: CPT

## 2019-04-30 PROCEDURE — 77370 RADIATION PHYSICS CONSULT: CPT | Performed by: RADIOLOGY

## 2019-04-30 PROCEDURE — 77295 3-D RADIOTHERAPY PLAN: CPT | Performed by: RADIOLOGY

## 2019-04-30 PROCEDURE — 85610 PROTHROMBIN TIME: CPT

## 2019-04-30 PROCEDURE — G0463 HOSPITAL OUTPT CLINIC VISIT: HCPCS

## 2019-05-01 ENCOUNTER — APPOINTMENT (OUTPATIENT)
Dept: RADIATION ONCOLOGY | Facility: HOSPITAL | Age: 84
End: 2019-05-01
Attending: RADIOLOGY
Payer: MEDICARE

## 2019-05-06 ENCOUNTER — APPOINTMENT (OUTPATIENT)
Dept: RADIATION ONCOLOGY | Facility: HOSPITAL | Age: 84
End: 2019-05-06
Attending: RADIOLOGY
Payer: MEDICARE

## 2019-05-06 PROCEDURE — 77373 STRTCTC BDY RAD THER TX DLVR: CPT | Performed by: RADIOLOGY

## 2019-05-06 PROCEDURE — 77280 THER RAD SIMULAJ FIELD SMPL: CPT | Performed by: RADIOLOGY

## 2019-05-06 PROCEDURE — 77334 RADIATION TREATMENT AID(S): CPT | Performed by: RADIOLOGY

## 2019-05-06 PROCEDURE — 77300 RADIATION THERAPY DOSE PLAN: CPT | Performed by: RADIOLOGY

## 2019-05-08 ENCOUNTER — APPOINTMENT (OUTPATIENT)
Dept: RADIATION ONCOLOGY | Facility: HOSPITAL | Age: 84
End: 2019-05-08
Attending: RADIOLOGY
Payer: MEDICARE

## 2019-05-08 PROCEDURE — 77373 STRTCTC BDY RAD THER TX DLVR: CPT | Performed by: RADIOLOGY

## 2019-05-10 ENCOUNTER — APPOINTMENT (OUTPATIENT)
Dept: RADIATION ONCOLOGY | Facility: HOSPITAL | Age: 84
End: 2019-05-10
Attending: RADIOLOGY
Payer: MEDICARE

## 2019-05-10 PROCEDURE — 77373 STRTCTC BDY RAD THER TX DLVR: CPT | Performed by: RADIOLOGY

## 2019-05-14 ENCOUNTER — APPOINTMENT (OUTPATIENT)
Dept: RADIATION ONCOLOGY | Facility: HOSPITAL | Age: 84
End: 2019-05-14
Attending: RADIOLOGY
Payer: MEDICARE

## 2019-05-14 PROCEDURE — 77373 STRTCTC BDY RAD THER TX DLVR: CPT | Performed by: RADIOLOGY

## 2019-05-16 ENCOUNTER — APPOINTMENT (OUTPATIENT)
Dept: RADIATION ONCOLOGY | Facility: HOSPITAL | Age: 84
End: 2019-05-16
Attending: RADIOLOGY
Payer: MEDICARE

## 2019-05-16 VITALS
SYSTOLIC BLOOD PRESSURE: 154 MMHG | RESPIRATION RATE: 18 BRPM | DIASTOLIC BLOOD PRESSURE: 62 MMHG | HEART RATE: 56 BPM | OXYGEN SATURATION: 98 % | TEMPERATURE: 97 F

## 2019-05-16 DIAGNOSIS — R91.8 MASS OF UPPER LOBE OF RIGHT LUNG: Primary | ICD-10-CM

## 2019-05-16 PROCEDURE — 77336 RADIATION PHYSICS CONSULT: CPT | Performed by: RADIOLOGY

## 2019-05-16 PROCEDURE — 77373 STRTCTC BDY RAD THER TX DLVR: CPT | Performed by: RADIOLOGY

## 2019-05-16 NOTE — PROGRESS NOTES
[facility] Radiation Treatment Management Note 1-5    Patient:  Lori Cid  Age:  80year old  Visit Diagnosis:    1.  Mass of upper lobe of right lung      Primary Rad/Onc:  Dr. Taty Monroy    Site Delivered Dose (Gy) Prescribe

## 2019-05-16 NOTE — PATIENT INSTRUCTIONS
Schedule your CT scan for after August 16th 2019. Call to make ct scan appointment. Then you will call [phone] to schedule a follow up with Dr. Carolene Peabody a few days after for the results.

## 2019-05-25 NOTE — PROGRESS NOTES
[facility]    PATIENT'S NAME: Marilyn Jillian   RADIATION ONCOLOGIST: Mirza Coulter MD   PATIENT ACCOUNT #: [de-identified] LOCATION: [address] [place] [address] RECORD #: [id number] YOB: 1926   DATE: 05/16/2019       RADIATION ONCOLOGY opted to treat definitively. As the patient was not a surgical candidate given his advanced age, we opted to treat with CyberKnife and I dosed him to 5000 cGy in 5 fractions as above.     TOLERANCE:  The patient tolerated the treatment extremely well and n

## 2019-06-06 ENCOUNTER — ANTI-COAG VISIT (OUTPATIENT)
Dept: INTERNAL MEDICINE CLINIC | Facility: CLINIC | Age: 84
End: 2019-06-06
Payer: MEDICARE

## 2019-06-06 DIAGNOSIS — I48.91 ATRIAL FIBRILLATION, UNSPECIFIED TYPE (HCC): ICD-10-CM

## 2019-06-06 PROCEDURE — 36416 COLLJ CAPILLARY BLOOD SPEC: CPT

## 2019-06-06 PROCEDURE — 85610 PROTHROMBIN TIME: CPT

## 2019-07-11 ENCOUNTER — ANTI-COAG VISIT (OUTPATIENT)
Dept: INTERNAL MEDICINE CLINIC | Facility: CLINIC | Age: 84
End: 2019-07-11
Payer: MEDICARE

## 2019-07-11 DIAGNOSIS — I48.91 ATRIAL FIBRILLATION, UNSPECIFIED TYPE (HCC): ICD-10-CM

## 2019-07-11 LAB
INR: 2.2 (ref 0.8–1.2)
TEST STRIP EXPIRATION DATE: ABNORMAL DATE

## 2019-07-11 PROCEDURE — 85610 PROTHROMBIN TIME: CPT

## 2019-07-11 PROCEDURE — 36416 COLLJ CAPILLARY BLOOD SPEC: CPT

## 2019-07-19 RX ORDER — WARFARIN SODIUM 2.5 MG/1
TABLET ORAL
Qty: 135 TABLET | Refills: 0 | Status: SHIPPED | OUTPATIENT
Start: 2019-07-19 | End: 2019-07-29

## 2019-07-19 NOTE — TELEPHONE ENCOUNTER
Requested Prescriptions     Signed Prescriptions Disp Refills   • WARFARIN SODIUM 2.5 MG Oral Tab 135 tablet 0     Sig: TAKE 1 TO 1 AND 1/2 TABLETS BY MOUTH EVERY EVENING AS DIRECTED BY COUMADIN CLINIC. Authorizing Provider:  Jake Osman

## 2019-07-29 ENCOUNTER — OFFICE VISIT (OUTPATIENT)
Dept: INTERNAL MEDICINE CLINIC | Facility: CLINIC | Age: 84
End: 2019-07-29
Payer: MEDICARE

## 2019-07-29 VITALS
HEART RATE: 54 BPM | HEIGHT: 70 IN | TEMPERATURE: 98 F | DIASTOLIC BLOOD PRESSURE: 80 MMHG | SYSTOLIC BLOOD PRESSURE: 126 MMHG | BODY MASS INDEX: 28.06 KG/M2 | OXYGEN SATURATION: 98 % | WEIGHT: 196 LBS | RESPIRATION RATE: 12 BRPM

## 2019-07-29 DIAGNOSIS — Z00.00 PHYSICAL EXAM, ANNUAL: Primary | ICD-10-CM

## 2019-07-29 DIAGNOSIS — Z96.1 PSEUDOPHAKIA: ICD-10-CM

## 2019-07-29 DIAGNOSIS — N18.30 CKD (CHRONIC KIDNEY DISEASE), STAGE III (HCC): ICD-10-CM

## 2019-07-29 DIAGNOSIS — H35.30 MACULAR DEGENERATION, UNSPECIFIED LATERALITY, UNSPECIFIED TYPE: ICD-10-CM

## 2019-07-29 DIAGNOSIS — I48.0 PAF (PAROXYSMAL ATRIAL FIBRILLATION) (HCC): ICD-10-CM

## 2019-07-29 DIAGNOSIS — C44.90 SKIN CANCER: ICD-10-CM

## 2019-07-29 DIAGNOSIS — I35.0 AORTIC VALVE STENOSIS, ETIOLOGY OF CARDIAC VALVE DISEASE UNSPECIFIED: ICD-10-CM

## 2019-07-29 DIAGNOSIS — C20 RECTAL CANCER (HCC): ICD-10-CM

## 2019-07-29 DIAGNOSIS — I26.99 OTHER PULMONARY EMBOLISM WITHOUT ACUTE COR PULMONALE, UNSPECIFIED CHRONICITY (HCC): ICD-10-CM

## 2019-07-29 DIAGNOSIS — R26.9 GAIT ABNORMALITY: ICD-10-CM

## 2019-07-29 DIAGNOSIS — I27.20 PULMONARY HTN (HCC): ICD-10-CM

## 2019-07-29 DIAGNOSIS — C34.11 MALIGNANT NEOPLASM OF UPPER LOBE OF RIGHT LUNG (HCC): ICD-10-CM

## 2019-07-29 PROCEDURE — G0439 PPPS, SUBSEQ VISIT: HCPCS | Performed by: INTERNAL MEDICINE

## 2019-07-29 RX ORDER — WARFARIN SODIUM 2.5 MG/1
TABLET ORAL
Qty: 90 TABLET | Refills: 3 | Status: SHIPPED | OUTPATIENT
Start: 2019-07-29 | End: 2020-01-01

## 2019-07-29 NOTE — PROGRESS NOTES
Luis Carlos Vazquez is a 80year old male. HPI:   Patient presents with:   Annual: Medicare annual. Pt reports he has been feeling well.       81 y/o M here for subsequent Medicare annual wellness exam; pt was diagnosed with primary lung cancer; CT chest in [facility] Biopsy of Rectal mass:  Invasive moderately differentiated adenocarcinoma in background of tubular adenoma        Past Surgical History:   Procedure Laterality Date   • COLONOSCOPY  09-   • ELECTROCARDIOGRAM, COMPLETE  03-    Scanned to media mental well-being?: Visiting Family    If you are a male age 38-65 or a female age 47-67, do you take aspirin?: No    Have you had any immunizations at another office such as Influenza, Hepatitis B, Tetanus, or Pneumococcal?: No     Functional Ability list are recommended by your physician but may not be covered, or covered at this frequency, by your insurer. Please check with your insurance carrier before scheduling to verify coverage.     PREVENTATIVE SERVICES  INDICATIONS AND SCHEDULE Internal Lab or Annual Monitoring of Persistent     Medications (ACE/ARB, digoxin, diuretics)    Potassium  Annually Potassium (mmol/L)   Date Value   06/18/2018 4.9     POTASSIUM (P) (mmol/L)   Date Value   09/23/2016 4.8    No flowsheet data found.     Creatinine  Nikki 12, height 5' 10\" (1.778 m), weight 196 lb (88.9 kg), SpO2 98 %.   Constitutional: alert and oriented x3 in no acute distress  HEENT- EOMI, PERRL  Nose/Mouth/Throat: pharynx without erythema; no oral lesions  Neck/Thyroid: neck supple; no thyromegaly  Lymp fibrillation  EKG in Dec 2017 shows NSR with RBBB (old also seen in 2014) but also has T wave inversion in inferior leads (III, AVF-old) and anterior leads (V3, and V4); troponin is elevated x 3 sets at 0.13; HR controlled on metoprolol 25 mg po BID; medic weeks     Pseudophakia  Had cataract removed left eye in May 2018 by Dr Nika Denis back;  Pathologic type not available; s/p excision in Feb 2019 by dermatologist, Dr Brian Carter in [facility], [phone]         RTC 6 mos              Or

## 2019-08-13 ENCOUNTER — LAB ENCOUNTER (OUTPATIENT)
Dept: LAB | Age: 84
End: 2019-08-13
Attending: INTERNAL MEDICINE
Payer: MEDICARE

## 2019-08-13 DIAGNOSIS — C20 RECTAL CANCER (HCC): ICD-10-CM

## 2019-08-13 DIAGNOSIS — N18.30 CKD (CHRONIC KIDNEY DISEASE), STAGE III (HCC): ICD-10-CM

## 2019-08-13 DIAGNOSIS — C34.11 MALIGNANT NEOPLASM OF UPPER LOBE OF RIGHT LUNG (HCC): ICD-10-CM

## 2019-08-13 DIAGNOSIS — I26.99 OTHER PULMONARY EMBOLISM WITHOUT ACUTE COR PULMONALE, UNSPECIFIED CHRONICITY (HCC): ICD-10-CM

## 2019-08-13 DIAGNOSIS — I48.0 PAF (PAROXYSMAL ATRIAL FIBRILLATION) (HCC): ICD-10-CM

## 2019-08-13 LAB
ALBUMIN SERPL-MCNC: 3.7 G/DL (ref 3.4–5)
ALBUMIN/GLOB SERPL: 1 {RATIO} (ref 1–2)
ALP LIVER SERPL-CCNC: 35 U/L (ref 45–117)
ALT SERPL-CCNC: 20 U/L (ref 16–61)
ANION GAP SERPL CALC-SCNC: 8 MMOL/L (ref 0–18)
AST SERPL-CCNC: 11 U/L (ref 15–37)
BASOPHILS # BLD AUTO: 0.06 X10(3) UL (ref 0–0.2)
BASOPHILS NFR BLD AUTO: 0.9 %
BILIRUB SERPL-MCNC: 0.5 MG/DL (ref 0.1–2)
BUN BLD-MCNC: 38 MG/DL (ref 7–18)
BUN/CREAT SERPL: 19.6 (ref 10–20)
CALCIUM BLD-MCNC: 9 MG/DL (ref 8.5–10.1)
CEA SERPL-MCNC: 2.6 NG/ML (ref ?–5)
CHLORIDE SERPL-SCNC: 108 MMOL/L (ref 98–112)
CO2 SERPL-SCNC: 25 MMOL/L (ref 21–32)
CREAT BLD-MCNC: 1.94 MG/DL (ref 0.7–1.3)
DEPRECATED RDW RBC AUTO: 54.4 FL (ref 35.1–46.3)
EOSINOPHIL # BLD AUTO: 0.24 X10(3) UL (ref 0–0.7)
EOSINOPHIL NFR BLD AUTO: 3.7 %
ERYTHROCYTE [DISTWIDTH] IN BLOOD BY AUTOMATED COUNT: 15.3 % (ref 11–15)
GLOBULIN PLAS-MCNC: 3.7 G/DL (ref 2.8–4.4)
GLUCOSE BLD-MCNC: 148 MG/DL (ref 70–99)
HCT VFR BLD AUTO: 38.7 % (ref 39–53)
HGB BLD-MCNC: 12.3 G/DL (ref 13–17.5)
IMM GRANULOCYTES # BLD AUTO: 0.03 X10(3) UL (ref 0–1)
IMM GRANULOCYTES NFR BLD: 0.5 %
LYMPHOCYTES # BLD AUTO: 1.27 X10(3) UL (ref 1–4)
LYMPHOCYTES NFR BLD AUTO: 19.7 %
M PROTEIN MFR SERPL ELPH: 7.4 G/DL (ref 6.4–8.2)
MCH RBC QN AUTO: 31 PG (ref 26–34)
MCHC RBC AUTO-ENTMCNC: 31.8 G/DL (ref 31–37)
MCV RBC AUTO: 97.5 FL (ref 80–100)
MONOCYTES # BLD AUTO: 0.54 X10(3) UL (ref 0.1–1)
MONOCYTES NFR BLD AUTO: 8.4 %
NEUTROPHILS # BLD AUTO: 4.3 X10 (3) UL (ref 1.5–7.7)
NEUTROPHILS # BLD AUTO: 4.3 X10(3) UL (ref 1.5–7.7)
NEUTROPHILS NFR BLD AUTO: 66.8 %
OSMOLALITY SERPL CALC.SUM OF ELEC: 304 MOSM/KG (ref 275–295)
PATIENT FASTING: NO
PLATELET # BLD AUTO: 164 10(3)UL (ref 150–450)
POTASSIUM SERPL-SCNC: 4.4 MMOL/L (ref 3.5–5.1)
RBC # BLD AUTO: 3.97 X10(6)UL (ref 3.8–5.8)
SODIUM SERPL-SCNC: 141 MMOL/L (ref 136–145)
TSI SER-ACNC: 7.58 MIU/ML (ref 0.36–3.74)
WBC # BLD AUTO: 6.4 X10(3) UL (ref 4–11)

## 2019-08-13 PROCEDURE — 85025 COMPLETE CBC W/AUTO DIFF WBC: CPT

## 2019-08-13 PROCEDURE — 80053 COMPREHEN METABOLIC PANEL: CPT

## 2019-08-13 PROCEDURE — 82378 CARCINOEMBRYONIC ANTIGEN: CPT

## 2019-08-13 PROCEDURE — 36415 COLL VENOUS BLD VENIPUNCTURE: CPT

## 2019-08-13 PROCEDURE — 84443 ASSAY THYROID STIM HORMONE: CPT

## 2019-08-15 ENCOUNTER — ANTI-COAG VISIT (OUTPATIENT)
Dept: INTERNAL MEDICINE CLINIC | Facility: CLINIC | Age: 84
End: 2019-08-15
Payer: MEDICARE

## 2019-08-15 DIAGNOSIS — I48.91 ATRIAL FIBRILLATION, UNSPECIFIED TYPE (HCC): ICD-10-CM

## 2019-08-15 LAB
INR: 3.1 (ref 0.8–1.2)
TEST STRIP EXPIRATION DATE: ABNORMAL DATE

## 2019-08-15 PROCEDURE — 85610 PROTHROMBIN TIME: CPT

## 2019-08-15 PROCEDURE — 99211 OFF/OP EST MAY X REQ PHY/QHP: CPT

## 2019-08-15 PROCEDURE — G0463 HOSPITAL OUTPT CLINIC VISIT: HCPCS

## 2019-08-15 PROCEDURE — 36416 COLLJ CAPILLARY BLOOD SPEC: CPT

## 2019-08-22 ENCOUNTER — HOSPITAL ENCOUNTER (OUTPATIENT)
Dept: CT IMAGING | Facility: HOSPITAL | Age: 84
Discharge: HOME | End: 2019-08-22
Attending: RADIOLOGY | Admitting: INTERNAL MEDICINE
Payer: MEDICARE

## 2019-08-22 ENCOUNTER — ANTI-COAG VISIT (OUTPATIENT)
Dept: INTERNAL MEDICINE CLINIC | Facility: CLINIC | Age: 84
End: 2019-08-22
Payer: MEDICARE

## 2019-08-22 DIAGNOSIS — R91.8 MASS OF UPPER LOBE OF RIGHT LUNG: ICD-10-CM

## 2019-08-22 DIAGNOSIS — I48.91 ATRIAL FIBRILLATION, UNSPECIFIED TYPE (HCC): ICD-10-CM

## 2019-08-22 LAB
INR: 2.5 (ref 0.8–1.2)
TEST STRIP EXPIRATION DATE: ABNORMAL DATE

## 2019-08-22 PROCEDURE — 85610 PROTHROMBIN TIME: CPT

## 2019-08-22 PROCEDURE — G0463 HOSPITAL OUTPT CLINIC VISIT: HCPCS

## 2019-08-22 PROCEDURE — 36416 COLLJ CAPILLARY BLOOD SPEC: CPT

## 2019-08-22 PROCEDURE — 99211 OFF/OP EST MAY X REQ PHY/QHP: CPT

## 2019-08-22 PROCEDURE — 71250 CT THORAX DX C-: CPT | Performed by: RADIOLOGY

## 2019-08-29 ENCOUNTER — OFFICE VISIT (OUTPATIENT)
Dept: RADIATION ONCOLOGY | Facility: HOSPITAL | Age: 84
End: 2019-08-29
Attending: RADIOLOGY
Payer: MEDICARE

## 2019-08-29 VITALS
DIASTOLIC BLOOD PRESSURE: 56 MMHG | RESPIRATION RATE: 18 BRPM | SYSTOLIC BLOOD PRESSURE: 144 MMHG | HEART RATE: 62 BPM | TEMPERATURE: 98 F | OXYGEN SATURATION: 94 %

## 2019-08-29 DIAGNOSIS — R91.8 MASS OF UPPER LOBE OF RIGHT LUNG: Primary | ICD-10-CM

## 2019-08-29 PROCEDURE — 99211 OFF/OP EST MAY X REQ PHY/QHP: CPT

## 2019-08-29 RX ORDER — DOXYCYCLINE HYCLATE 100 MG
TABLET ORAL
Refills: 0 | COMMUNITY
Start: 2019-08-22 | End: 2020-01-01

## 2019-08-29 NOTE — PROGRESS NOTES
Pt came in accompanied by his wife. Medical history reviewed. Medications reviewed. Pt vss. Denies any pain. Denies any cough or phlegm. C/O son with exertion. Pt had surgery to left side of face for skin cancer. Eating and drinking well.   Pt has no

## 2019-08-29 NOTE — PATIENT INSTRUCTIONS
Ct scan due in 6 months February 2020. Call to schedule at [phone]. Then call [phone] to schedule a follow up with Dr. Francoise Vásquez for results.

## 2019-08-29 NOTE — PROGRESS NOTES
[facility]    PATIENT'S NAME: Akila Power   RADIATION ONCOLOGIST: Duyen Love.  Lola Cyr MD   PATIENT ACCOUNT #: [de-identified] LOCATION: [address] [place] [address] RECORD #: [id number] YOB: 1926   FOLLOW-UP DATE: 08/29/2019       RADIATIO denies any particular issues or complaints on his visit today. He denies any cough and has had no hemoptysis. His appetite is good, and his weight is stable. He has had some fatigue, but attributes this to advancing age.   He also has some stable dyspnea should be any questions regarding the radiotherapy, please feel free to contact me at any time. Dictated By Brigido Rodriguez MD  d: 08/29/2019 09:09:53  t: 08/29/2019 10:59:02  [facility] [id number]  NAD/    cc: Brent Moreno.  Nomi Pillai, [address]

## 2019-09-19 ENCOUNTER — ANTI-COAG VISIT (OUTPATIENT)
Dept: INTERNAL MEDICINE CLINIC | Facility: CLINIC | Age: 84
End: 2019-09-19
Payer: MEDICARE

## 2019-09-19 DIAGNOSIS — I48.91 ATRIAL FIBRILLATION, UNSPECIFIED TYPE (HCC): ICD-10-CM

## 2019-09-19 LAB
INR: 3.8 (ref 0.8–1.2)
TEST STRIP EXPIRATION DATE: ABNORMAL DATE

## 2019-09-19 PROCEDURE — G0463 HOSPITAL OUTPT CLINIC VISIT: HCPCS

## 2019-09-19 PROCEDURE — 36416 COLLJ CAPILLARY BLOOD SPEC: CPT

## 2019-09-19 PROCEDURE — 85610 PROTHROMBIN TIME: CPT

## 2019-10-03 ENCOUNTER — ANTI-COAG VISIT (OUTPATIENT)
Dept: INTERNAL MEDICINE CLINIC | Facility: CLINIC | Age: 84
End: 2019-10-03
Payer: MEDICARE

## 2019-10-03 DIAGNOSIS — I48.91 ATRIAL FIBRILLATION, UNSPECIFIED TYPE (HCC): ICD-10-CM

## 2019-10-03 PROCEDURE — 36416 COLLJ CAPILLARY BLOOD SPEC: CPT

## 2019-10-03 PROCEDURE — 85610 PROTHROMBIN TIME: CPT

## 2019-10-03 PROCEDURE — G0463 HOSPITAL OUTPT CLINIC VISIT: HCPCS

## 2019-10-24 ENCOUNTER — ANTI-COAG VISIT (OUTPATIENT)
Dept: INTERNAL MEDICINE CLINIC | Facility: CLINIC | Age: 84
End: 2019-10-24
Payer: MEDICARE

## 2019-10-24 DIAGNOSIS — I48.91 ATRIAL FIBRILLATION, UNSPECIFIED TYPE (HCC): ICD-10-CM

## 2019-10-24 PROCEDURE — G0463 HOSPITAL OUTPT CLINIC VISIT: HCPCS

## 2019-10-24 PROCEDURE — 85610 PROTHROMBIN TIME: CPT

## 2019-10-24 PROCEDURE — 36416 COLLJ CAPILLARY BLOOD SPEC: CPT

## 2019-11-26 ENCOUNTER — ANTI-COAG VISIT (OUTPATIENT)
Dept: INTERNAL MEDICINE CLINIC | Facility: CLINIC | Age: 84
End: 2019-11-26
Payer: MEDICARE

## 2019-11-26 DIAGNOSIS — I48.91 ATRIAL FIBRILLATION, UNSPECIFIED TYPE (HCC): ICD-10-CM

## 2019-11-26 PROCEDURE — 85610 PROTHROMBIN TIME: CPT

## 2019-11-26 PROCEDURE — 36416 COLLJ CAPILLARY BLOOD SPEC: CPT

## 2019-12-30 ENCOUNTER — ANTI-COAG VISIT (OUTPATIENT)
Dept: INTERNAL MEDICINE CLINIC | Facility: CLINIC | Age: 84
End: 2019-12-30
Payer: MEDICARE

## 2019-12-30 DIAGNOSIS — I48.91 ATRIAL FIBRILLATION, UNSPECIFIED TYPE (HCC): ICD-10-CM

## 2019-12-30 PROCEDURE — 85610 PROTHROMBIN TIME: CPT

## 2019-12-30 PROCEDURE — 36416 COLLJ CAPILLARY BLOOD SPEC: CPT

## 2020-01-01 ENCOUNTER — TELEPHONE (OUTPATIENT)
Dept: INTERNAL MEDICINE CLINIC | Facility: CLINIC | Age: 85
End: 2020-01-01

## 2020-01-01 ENCOUNTER — OFFICE VISIT (OUTPATIENT)
Dept: INTERNAL MEDICINE CLINIC | Facility: CLINIC | Age: 85
End: 2020-01-01
Payer: MEDICARE

## 2020-01-01 ENCOUNTER — TELEPHONE (OUTPATIENT)
Dept: RADIATION ONCOLOGY | Facility: HOSPITAL | Age: 85
End: 2020-01-01

## 2020-01-01 ENCOUNTER — APPOINTMENT (OUTPATIENT)
Dept: LAB | Age: 85
End: 2020-01-01
Attending: INTERNAL MEDICINE
Payer: MEDICARE

## 2020-01-01 ENCOUNTER — ANTI-COAG VISIT (OUTPATIENT)
Dept: INTERNAL MEDICINE CLINIC | Facility: CLINIC | Age: 85
End: 2020-01-01
Payer: MEDICARE

## 2020-01-01 ENCOUNTER — TELEMEDICINE (OUTPATIENT)
Dept: INTERNAL MEDICINE CLINIC | Facility: CLINIC | Age: 85
End: 2020-01-01
Payer: MEDICARE

## 2020-01-01 ENCOUNTER — HOSPITAL ENCOUNTER (OUTPATIENT)
Dept: CT IMAGING | Facility: HOSPITAL | Age: 85
Discharge: HOME | End: 2020-01-01
Attending: RADIOLOGY
Payer: MEDICARE

## 2020-01-01 ENCOUNTER — VIRTUAL PHONE E/M (OUTPATIENT)
Dept: RADIATION ONCOLOGY | Facility: HOSPITAL | Age: 85
End: 2020-01-01
Attending: RADIOLOGY
Payer: MEDICARE

## 2020-01-01 ENCOUNTER — LAB ENCOUNTER (OUTPATIENT)
Dept: LAB | Age: 85
End: 2020-01-01
Attending: INTERNAL MEDICINE
Payer: MEDICARE

## 2020-01-01 VITALS
DIASTOLIC BLOOD PRESSURE: 82 MMHG | HEART RATE: 52 BPM | OXYGEN SATURATION: 98 % | BODY MASS INDEX: 26.77 KG/M2 | TEMPERATURE: 96 F | WEIGHT: 187 LBS | SYSTOLIC BLOOD PRESSURE: 152 MMHG | HEIGHT: 70 IN

## 2020-01-01 DIAGNOSIS — I26.99 OTHER PULMONARY EMBOLISM WITHOUT ACUTE COR PULMONALE, UNSPECIFIED CHRONICITY (HCC): ICD-10-CM

## 2020-01-01 DIAGNOSIS — G31.1 SENILE DEGENERATION OF BRAIN (HCC): ICD-10-CM

## 2020-01-01 DIAGNOSIS — C34.90 MALIGNANT NEOPLASM OF LUNG, UNSPECIFIED LATERALITY, UNSPECIFIED PART OF LUNG (HCC): Primary | ICD-10-CM

## 2020-01-01 DIAGNOSIS — C34.11 MALIGNANT NEOPLASM OF UPPER LOBE OF RIGHT LUNG (HCC): ICD-10-CM

## 2020-01-01 DIAGNOSIS — R26.9 GAIT ABNORMALITY: ICD-10-CM

## 2020-01-01 DIAGNOSIS — E53.8 VITAMIN B12 DEFICIENCY: ICD-10-CM

## 2020-01-01 DIAGNOSIS — I48.91 ATRIAL FIBRILLATION, UNSPECIFIED TYPE (HCC): ICD-10-CM

## 2020-01-01 DIAGNOSIS — C20 RECTAL CANCER (HCC): ICD-10-CM

## 2020-01-01 DIAGNOSIS — I27.20 PULMONARY HTN (HCC): ICD-10-CM

## 2020-01-01 DIAGNOSIS — E03.9 HYPOTHYROIDISM, UNSPECIFIED TYPE: Primary | ICD-10-CM

## 2020-01-01 DIAGNOSIS — N18.30 CKD (CHRONIC KIDNEY DISEASE), STAGE III (HCC): ICD-10-CM

## 2020-01-01 DIAGNOSIS — I35.0 AORTIC VALVE STENOSIS, ETIOLOGY OF CARDIAC VALVE DISEASE UNSPECIFIED: ICD-10-CM

## 2020-01-01 DIAGNOSIS — E03.9 HYPOTHYROIDISM, UNSPECIFIED TYPE: ICD-10-CM

## 2020-01-01 DIAGNOSIS — H35.30 MACULAR DEGENERATION, UNSPECIFIED LATERALITY, UNSPECIFIED TYPE: ICD-10-CM

## 2020-01-01 DIAGNOSIS — N18.30 STAGE 3 CHRONIC KIDNEY DISEASE, UNSPECIFIED WHETHER STAGE 3A OR 3B CKD (HCC): ICD-10-CM

## 2020-01-01 DIAGNOSIS — R91.8 MASS OF UPPER LOBE OF RIGHT LUNG: Primary | ICD-10-CM

## 2020-01-01 DIAGNOSIS — C34.90 MALIGNANT NEOPLASM OF LUNG, UNSPECIFIED LATERALITY, UNSPECIFIED PART OF LUNG (HCC): ICD-10-CM

## 2020-01-01 DIAGNOSIS — C34.11 MALIGNANT NEOPLASM OF UPPER LOBE OF RIGHT LUNG (HCC): Primary | ICD-10-CM

## 2020-01-01 DIAGNOSIS — R91.8 MASS OF UPPER LOBE OF RIGHT LUNG: ICD-10-CM

## 2020-01-01 LAB
INR: 1.8 (ref 0.8–1.2)
INR: 1.9 (ref 0.8–1.2)
INR: 2.1 (ref 0.8–1.2)
INR: 2.3 (ref 0.8–1.2)
INR: 2.6 (ref 0.8–1.2)

## 2020-01-01 PROCEDURE — 84481 FREE ASSAY (FT-3): CPT

## 2020-01-01 PROCEDURE — G0463 HOSPITAL OUTPT CLINIC VISIT: HCPCS | Performed by: INTERNAL MEDICINE

## 2020-01-01 PROCEDURE — 99215 OFFICE O/P EST HI 40 MIN: CPT | Performed by: INTERNAL MEDICINE

## 2020-01-01 PROCEDURE — 85610 PROTHROMBIN TIME: CPT | Performed by: INTERNAL MEDICINE

## 2020-01-01 PROCEDURE — 36415 COLL VENOUS BLD VENIPUNCTURE: CPT

## 2020-01-01 PROCEDURE — 84443 ASSAY THYROID STIM HORMONE: CPT

## 2020-01-01 PROCEDURE — 82607 VITAMIN B-12: CPT

## 2020-01-01 PROCEDURE — 84439 ASSAY OF FREE THYROXINE: CPT

## 2020-01-01 PROCEDURE — 85610 PROTHROMBIN TIME: CPT

## 2020-01-01 PROCEDURE — 36416 COLLJ CAPILLARY BLOOD SPEC: CPT

## 2020-01-01 PROCEDURE — 36416 COLLJ CAPILLARY BLOOD SPEC: CPT | Performed by: INTERNAL MEDICINE

## 2020-01-01 PROCEDURE — 99214 OFFICE O/P EST MOD 30 MIN: CPT | Performed by: INTERNAL MEDICINE

## 2020-01-01 PROCEDURE — 71250 CT THORAX DX C-: CPT | Performed by: RADIOLOGY

## 2020-01-01 PROCEDURE — 80053 COMPREHEN METABOLIC PANEL: CPT

## 2020-01-01 PROCEDURE — 85025 COMPLETE CBC W/AUTO DIFF WBC: CPT

## 2020-01-01 RX ORDER — WARFARIN SODIUM 2.5 MG/1
TABLET ORAL
Qty: 90 TABLET | Refills: 0 | Status: SHIPPED | OUTPATIENT
Start: 2020-01-01

## 2020-01-01 RX ORDER — LEVOTHYROXINE SODIUM 0.05 MG/1
50 TABLET ORAL
Qty: 30 TABLET | Refills: 5 | Status: SHIPPED | OUTPATIENT
Start: 2020-01-01 | End: 2021-01-01

## 2020-01-27 ENCOUNTER — ANTI-COAG VISIT (OUTPATIENT)
Dept: INTERNAL MEDICINE CLINIC | Facility: CLINIC | Age: 85
End: 2020-01-27
Payer: MEDICARE

## 2020-01-27 DIAGNOSIS — I48.91 ATRIAL FIBRILLATION, UNSPECIFIED TYPE (HCC): ICD-10-CM

## 2020-01-27 LAB
INR: 3.3 (ref 0.8–1.2)
TEST STRIP EXPIRATION DATE: ABNORMAL DATE

## 2020-01-27 PROCEDURE — G0463 HOSPITAL OUTPT CLINIC VISIT: HCPCS

## 2020-01-27 PROCEDURE — 36416 COLLJ CAPILLARY BLOOD SPEC: CPT

## 2020-01-27 PROCEDURE — 85610 PROTHROMBIN TIME: CPT

## 2020-02-17 ENCOUNTER — TELEPHONE (OUTPATIENT)
Dept: INTERNAL MEDICINE CLINIC | Facility: CLINIC | Age: 85
End: 2020-02-17

## 2020-02-17 ENCOUNTER — ANTI-COAG VISIT (OUTPATIENT)
Dept: INTERNAL MEDICINE CLINIC | Facility: CLINIC | Age: 85
End: 2020-02-17
Payer: MEDICARE

## 2020-02-17 DIAGNOSIS — I48.91 ATRIAL FIBRILLATION, UNSPECIFIED TYPE (HCC): ICD-10-CM

## 2020-02-17 LAB
INR: 2.3 (ref 0.8–1.2)
TEST STRIP EXPIRATION DATE: ABNORMAL DATE

## 2020-02-17 PROCEDURE — 36416 COLLJ CAPILLARY BLOOD SPEC: CPT

## 2020-02-17 PROCEDURE — 85610 PROTHROMBIN TIME: CPT

## 2020-02-17 PROCEDURE — G0463 HOSPITAL OUTPT CLINIC VISIT: HCPCS

## 2020-02-17 NOTE — TELEPHONE ENCOUNTER
Pt and wife here today for INR; Wife relates pt falls have increased (7; some with cane, one with walker); no head injury reported;   FYI to DR. MOSER

## 2020-03-03 ENCOUNTER — TELEPHONE (OUTPATIENT)
Dept: RADIATION ONCOLOGY | Facility: HOSPITAL | Age: 85
End: 2020-03-03

## 2020-03-03 NOTE — TELEPHONE ENCOUNTER
Patient was to have a f/u CT scan in February and see Dr. Marco Desai. Home phone disconnected. Daughter Kaye Bhardwaj reluctantly gave me mother's cell number.   Spoke to wife Tuyet Webb who stated that patient is 80 and seems to be doing well but does not get around ve

## 2020-03-31 ENCOUNTER — TELEPHONE (OUTPATIENT)
Dept: INTERNAL MEDICINE CLINIC | Facility: CLINIC | Age: 85
End: 2020-03-31

## 2020-03-31 DIAGNOSIS — I48.91 ATRIAL FIBRILLATION, UNSPECIFIED TYPE (HCC): ICD-10-CM

## 2020-03-31 NOTE — TELEPHONE ENCOUNTER
Curly Borrero is calling to speak with Chris Pryor regarding Miguel's appt.  Ph. # [phone]   Routed high to [facility]

## 2020-04-02 ENCOUNTER — ANTI-COAG VISIT (OUTPATIENT)
Dept: INTERNAL MEDICINE CLINIC | Facility: CLINIC | Age: 85
End: 2020-04-02
Payer: MEDICARE

## 2020-04-02 DIAGNOSIS — I48.91 ATRIAL FIBRILLATION, UNSPECIFIED TYPE (HCC): ICD-10-CM

## 2020-04-02 PROCEDURE — 85610 PROTHROMBIN TIME: CPT

## 2020-04-02 PROCEDURE — 36416 COLLJ CAPILLARY BLOOD SPEC: CPT

## 2020-05-22 NOTE — PROGRESS NOTES
This patient verbally consents for this visit to be a Virtual/Telephone service. The patient understands and accepts financial responsibility for any deductible, co-insurance and/or co-pays associated with this service.   The patient was in the state [facility]

## 2020-05-23 NOTE — PROGRESS NOTES
[facility]    PATIENT'S NAME: Unknown Jina   RADIATION ONCOLOGIST: Josefina Rhodes.  Jeanna Gardner MD   PATIENT ACCOUNT #: [de-identified] LOCATION: [address] [place] [address] RECORD #: [id number] YOB: 1926   FOLLOW-UP DATE: 05/15/2020       RADIATIO lung lesion appeared highly suspicious, and the patient was not deemed a good candidate for surgery based upon his advanced age and pulmonary status. He was also not thought to be a good candidate for biopsy based upon the potential risk.   Given the high progression and has had no meaningful side effects as a result of treatment. He continues to do well, and I am optimistic that we have achieved good control over his tumor.   It was a T2, so the likelihood of long-term control is somewhat reduced, but I st

## 2020-06-01 PROBLEM — I27.20 PULMONARY HTN (HCC): Status: ACTIVE | Noted: 2020-01-01

## 2020-06-01 NOTE — PROGRESS NOTES
Lio Betancourt is a 80year old male. HPI:   Patient presents with:  Checkup: Pt needs HH orders.  Pt would like to use [employer] [phone]      79 y/o M with lung cancer, DVT here with wife regarding his increasing dependency on ADLs; Dresses in Social History: Social History    Tobacco Use      Smoking status: Never Smoker      Smokeless tobacco: Never Used    Alcohol use: No      Alcohol/week: 0.0 standard drinks    Drug use: No       Medications (Active prior to today's visit):  Current Ou 187 lb (84.8 kg), SpO2 98 %.   Constitutional: alert and oriented x3 in no acute distress  HEENT- EOMI, PERRL  Nose/Mouth/Throat: pharynx without erythema  Neck/Thyroid: neck supple; no thyromegaly  Lymphatics: no lymphadenopathy of neck or groin  Cardiovas scan from August 2019 and decreased in size from its original 3.9 cm.   There was no hilar or mediastinal lymphadenopathy or no other evidence of progression or metastatic disease.  -has been recommended for CT chst q 6 mos; F/U Rad Onc Dr Tuyet Momin     par today per [facility] Coumadin clinic; on 1.25 mg Wed, Sun, and  warfarin 2.5 mg po qHS all other days; warfarin per [facility] coumadin clinic     Chronic kidney disease  creatinine near baseline at 1.69; check CBC, CMP     Macular degeneration  The patient takes PreserV

## 2020-06-03 NOTE — TELEPHONE ENCOUNTER
Pt wife and daughter like a call from . they had a few questions regarding in home care they forgot to ask lest time pt was in. Please advise call Hermilo Schmidt and she will connect with mother.

## 2020-06-03 NOTE — TELEPHONE ENCOUNTER
Will dgtr Mayelin Eddy when she calls back with correct phone number; d/w wife Rafi Dunbar    dgtr [phone] called    Will place referral to [facility] for Home RN and home care assessment

## 2020-06-03 NOTE — TELEPHONE ENCOUNTER
To Dr. Rai Gallego----    Attempted to call Suzan Gardner on listed [phone] and was advised it is the wrong # for patient. [phone] [facility];  listed as a business in [place] affiliated with [facility]? Do you have another contact #?

## 2020-06-05 NOTE — TELEPHONE ENCOUNTER
ASHLEIGH Sim calling back, she states her mom and patient were under the impression that the DNR forms were already signed. Bekah Hill states that the forms that were signed were probably advanced directive forms. DNR form is being requested by the [address] [place] [address]. Bekah Hill states that now that she knows DNR has not been signed by pt or mother, she will let her mother Conrad Art know that these forms require signatures still as she is the POA and she will be signing the DNR for patient and herself. Pt's DNR form placed in envelope at .

## 2020-06-05 NOTE — TELEPHONE ENCOUNTER
TCB for [employer]. FYI Nursing--Per Dr. Joss Jarrell has been speaking to [facility] regarding this situation, MD received verbal consent from wife Lila who is also POA to speak with [employer] daughter and release routine/sensitive information to [facility] regarding father's care.

## 2020-06-05 NOTE — TELEPHONE ENCOUNTER
Please call pt spouses daughter, Mathieu Barahona, [phone]  Is DNR in place for pt?   If not please assist  They are arranging for in home care and this is required  Tasked to nursing    Similar request in for pt spouse, Georgi Mcburney, pt of Dr Alin Villa

## 2020-06-08 NOTE — TELEPHONE ENCOUNTER
S/w physical therapist Jagjit Keller. Verbal order give to proceed with plan of care. PT Gloria Quinteros will conduct therapy sessions.     ke Ma

## 2020-06-08 NOTE — TELEPHONE ENCOUNTER
Carlos A[facility] called with plan of care  Will see patient  2 X week - 1 week  1 X week - 5 weeks   Will be working on strength, balance & endurance

## 2020-06-15 NOTE — TELEPHONE ENCOUNTER
Called Renae to inquire more about reason for neurologist referral. Alyne Hashimoto not on patient's HIPAA    They want to have him evaluated for Dementia    When OT was there, he was insistent there was something on his ceiling and was waving his cane, althou

## 2020-06-15 NOTE — TELEPHONE ENCOUNTER
TSH 7.28; pt has had worsening cognitive function    Imp - subclinical hypothyroidism; dementia; lung cancer    Rec- free T3, free T4; check Vitamin B12 level; advise neurology consultation with Dr Boby Chavez MRI brain ordered to exclude metastatic

## 2020-06-25 NOTE — TELEPHONE ENCOUNTER
Pt had labs done on 6/17  Requests call back with results  Pt's wife is hard of hearing & her daughter Gianfranco Moore is requesting that call back goes to her [phone]   Shefali Ruby states Dr Augustus Escamilla has spoken with her before)

## 2020-06-25 NOTE — TELEPHONE ENCOUNTER
Imp- hypothyroidism    Rec- start levothyroxine 50 mcg po qD; re-check TFTs in one month; wife called; ERx sent    Unable to reach stepdgtr Louise Jackson; [facility]      Component      Latest Ref Rng & Units 6/18/2020 6/1/2020   TSH      0.358 - 3.740 mIU/mL  7.280 (H

## 2020-06-26 NOTE — TELEPHONE ENCOUNTER
Thea[facility] called  Pt cancelled visit for today  Next visit on Tuesday, will check INR at that time  Tasked to nursing as [facility]

## 2020-07-10 NOTE — TELEPHONE ENCOUNTER
Nikki Silence / [facility] is calling she is planning to discharge patient next week  She needs to discuss the plan of Care for PT/INR    Please call [phone]

## 2020-07-13 NOTE — TELEPHONE ENCOUNTER
Spoke to Jonas - she can do INR at last [facility] visit ;   Then we will fax orders to [facility] for next INR

## 2020-07-17 NOTE — TELEPHONE ENCOUNTER
On warfarin 1.25 mg on Wed, Sun, and 2.5 mg all other days; next PT/INR in 2 weeks; if this visit is last [facility] visit, advise pt call our office to see [employer] in 1 month for next PT/INR    Please call ОЛЕГ/ Brenda

## 2020-07-17 NOTE — TELEPHONE ENCOUNTER
Brenda/[facility] called with PT/INR results     INR 1.8     PT 21.4     This is final date for home health PT/INR     [phone]

## 2020-07-17 NOTE — TELEPHONE ENCOUNTER
Called Brenda from [facility] and relayed DR. SHANTI Dixon stated that patient is discharged and someone will come to patients house ( was set up with Sal[facility] Reese )

## 2020-08-10 NOTE — TELEPHONE ENCOUNTER
Trudi White left message on voicemail  Would like to request renewal of order for Home Health to have labs drawn at pt home  Pt is no longer able to go to hospital for blood draw  Tasked to nursing

## 2020-08-11 NOTE — TELEPHONE ENCOUNTER
Please call [facility] at [phone] and request referral    Please request Home Health and home nursing    Please FAX last visit from 6/1/20; please FAX referral (generated)    Please call wife Arnaldo Botelloick

## 2020-08-11 NOTE — TELEPHONE ENCOUNTER
Dr. Sanya Yost message relayed on [facility] VM. [facility] referral and 6/1/20 office visit note faxed to [facility]: [phone], fax confirmation received. New HIPAA form faxed to pt's home - [facility] notified.     To Managed care - please see open [facility] referral.

## 2020-08-21 NOTE — TELEPHONE ENCOUNTER
Jean-Paul Jordan - physical therapist w/[facility] called with update     Home Physical Therapy Evaluation is being moved from last week to this week

## 2020-08-21 NOTE — TELEPHONE ENCOUNTER
Moshe Clifton from [employer] is calling pt was seen for PT eval today pt. Will be seen once a week for four weeks ph.  # [phone]  Routed to clinical

## 2020-08-27 NOTE — TELEPHONE ENCOUNTER
Mauricio Hernandez / [facility] is calling patient is refusing visit today to have his PT/INR Checked  He will see the patient tomorrow.

## 2020-08-28 NOTE — TELEPHONE ENCOUNTER
Trinidad Galicia / [facility] is calling with PT/INR results from 8/28/2020    PT 25.5  INR 2.1    Warfarin  Mon & Wed 1/2 of a 2.5 mg tab  All other days 2.5 mg

## 2020-09-17 NOTE — TELEPHONE ENCOUNTER
Dr. Raven Nowak message relayed to pt's wife [facility] who verbalized understanding. Message also left on Telly's VM.   To Marissa Higgins

## 2020-09-17 NOTE — TELEPHONE ENCOUNTER
Telly/[facility] called:  PT 23.0  INR 1.9  Dosage:  M-W 1/2 tablet 2.5MG  Full tablet remaining days  Tasked to Coumadin

## 2020-09-17 NOTE — TELEPHONE ENCOUNTER
INR 1.9; advise 1.25 mg Wed, Sun, and  warfarin 2.5 mg po qHS all other days; re-check 1 month; please call Miguel Gutierrez

## 2020-09-24 NOTE — TELEPHONE ENCOUNTER
INR 2.3; 1.25 mg (2.5 mg x 0.5) every Sun, Wed; 2.5 mg (2.5 mg x 1) all other days; re-check 2 weeks; wife called    If hit head, advise ER visit to exclude brain bleeding; wife states \"he is fine\" and plans to monitor clinically; nonetheless, wife Molly Lucio

## 2020-09-24 NOTE — TELEPHONE ENCOUNTER
Please call Telly[facility]   PT 27.0  INR 2.3  Dosage 2.5MG tablets    Pt wife reported pt fell two days ago, striking head  No marks, no loss of consciousness  Pt is basically baseline    Tasked to Coumadin and nursing

## 2020-09-24 NOTE — TELEPHONE ENCOUNTER
To Dr. Serafin Auguste to please advise-----ASHLEIGH Pruitt to pt's wife Angelito Hanna who reports patient fell two days ago when he stood up from nap, stating pt's \"legs got twisted in carpet\". Angelito Hanna believes he hit his head on the door.  Angelito Hanna denies any open skin ar

## 2020-11-09 NOTE — TELEPHONE ENCOUNTER
Lizbeth Blank patient's step daughter is calling asking for in home care to come draw patient blood. Hasn't had blood drawn for about 7 months.     Best number to contact Lizbeth Blank at [phone]

## 2020-11-10 NOTE — TELEPHONE ENCOUNTER
Due for protime    called [facility] at [phone] to request initiation of Miguel 78    D-I-L Vitor Minium also called

## 2020-11-10 NOTE — TELEPHONE ENCOUNTER
Twan / [facility] is calling to clarify the order that Dr Channing Dougherty called in  Does he want blood drawn or just INR?     Please call [phone]

## 2020-11-10 NOTE — TELEPHONE ENCOUNTER
Left message for Shruthi/[facility] for her to draw labs ordered 6/25/20 as well Protime. Labs were to be drawn in July, 1 month from order.

## 2020-11-16 NOTE — TELEPHONE ENCOUNTER
Routed to Dr. Eduarda Hart - Should clinical tell [facility] to draw protime with or without other outstanding orders?

## 2020-11-16 NOTE — TELEPHONE ENCOUNTER
Pao Lara is calling back to speak with Nurse regarding Dr. Shruhti Caldera order. The order needs to be placed in the system, is the INR is the only reason and they need an H & P sent over from the last 90 days sent over . # [phone]  Fax.  # [phone]  Routed t

## 2020-11-17 NOTE — TELEPHONE ENCOUNTER
Spoke with wife -Barbie Luisenma  [employer] message and scheduled [facility]ximity appt on    11/18 at 4pm  Call Geoff cell [phone]

## 2020-11-17 NOTE — TELEPHONE ENCOUNTER
This pt needs a virtual visit; please explain to pt's wife that Medicare requires an office visit 90 days before or 30 days after [facility] started to justify home health services, and since last visit in June 2020, we do not yet meet Medicare's requirement, tho

## 2020-11-18 NOTE — TELEPHONE ENCOUNTER
Spoke to Wife she will schedule ct scan for follow up is hesitant may put it off for a while. She would like a virtual call for results once ct scan is done.

## 2020-11-18 NOTE — PROGRESS NOTES
Virtual Telephone Check-In    Anna Gonzalez verbally consents to a Virtual/Telephone Check-In visit on 11/18/20. Patient has been referred to the [facility] website at [email or web address] to review the yearly Consent to Treat document.     Patient Darlyn Zamora high clinical suspicion that this represented a lung tumor.  Patient was seen in consultation with Samuel Rueda, and he was treated with radiation therapy in the absence of a definitive pathologic diagnosis; s/p 5 fractions RT 5/6-5/19/19 via Cyber 4/18/17 by Dr Nacho Ch; biopsy negative for invasive carcinoma; pt had repeat flex-sig on 3/20/19; JACK; PET/CT on 3/14/19 showed nonspecific 14 x 18 mm diameter FDG avid focus within the posterior lumen of the upper rectum approximately 64 mm from the an

## 2020-11-19 NOTE — TELEPHONE ENCOUNTER
To Dr. Armen Maya - per Latia/HH:  Need new HH order - see pended. Do you want to add DVT to lab order so it can be addressed as well? Re lab dates: need specific dates - per Medicare rules. \"next week/anytime next week\" is too vague.

## 2020-11-19 NOTE — TELEPHONE ENCOUNTER
RN called; [facility] order generated; lab order FAXed

## 2020-11-19 NOTE — TELEPHONE ENCOUNTER
Latia, an intake nurse with [facility] called stating she received a referral for this pt. The order needs to be very specific with actual tests ordered, specific dates on when Dr. Wilmar Lawton the labs done. Diagnoses to support the labs ordered.   She noticed i

## 2020-12-18 NOTE — TELEPHONE ENCOUNTER
NAILA called [facility]--- therapeutic--- will continue same dose unless meds have changed; LM HHRN to call me back if changes---recheck 1 month

## 2020-12-18 NOTE — TELEPHONE ENCOUNTER
[facility] speak with [employer] regarding Miguel's results   INR 2.7   PT 32.0  Ph.  # [phone]   Routed high to [facility]

## 2020-12-21 NOTE — TELEPHONE ENCOUNTER
Pt has [facility] currently for INRs;   One refill given  Refill request is for a maintenance medication and has met the criteria specified in the Ambulatory Medication Refill Standing Order for eligibility, visits, laboratory, alerts and was sent to the requeste

## 2021-01-01 ENCOUNTER — TELEPHONE (OUTPATIENT)
Dept: INTERNAL MEDICINE CLINIC | Facility: CLINIC | Age: 86
End: 2021-01-01

## 2021-01-01 ENCOUNTER — LAB ENCOUNTER (OUTPATIENT)
Dept: LAB | Facility: HOSPITAL | Age: 86
End: 2021-01-01
Attending: INTERNAL MEDICINE
Payer: MEDICARE

## 2021-01-01 ENCOUNTER — APPOINTMENT (OUTPATIENT)
Dept: GENERAL RADIOLOGY | Age: 86
DRG: 689 | End: 2021-01-01
Attending: EMERGENCY MEDICINE

## 2021-01-01 ENCOUNTER — HOSPITAL ENCOUNTER (EMERGENCY)
Age: 86
Discharge: PSYCHIATRIC OR SUBSTANCE USE FACILITY | End: 2021-03-11
Attending: EMERGENCY MEDICINE

## 2021-01-01 ENCOUNTER — TELEPHONE (OUTPATIENT)
Dept: NEUROLOGY | Facility: CLINIC | Age: 86
End: 2021-01-01

## 2021-01-01 ENCOUNTER — APPOINTMENT (OUTPATIENT)
Dept: ULTRASOUND IMAGING | Age: 86
DRG: 689 | End: 2021-01-01
Attending: INTERNAL MEDICINE

## 2021-01-01 ENCOUNTER — APPOINTMENT (OUTPATIENT)
Dept: CT IMAGING | Age: 86
DRG: 689 | End: 2021-01-01
Attending: EMERGENCY MEDICINE

## 2021-01-01 ENCOUNTER — ADVANCED DIRECTIVES (OUTPATIENT)
Dept: HEALTH INFORMATION MANAGEMENT | Age: 86
End: 2021-01-01

## 2021-01-01 ENCOUNTER — CASE MANAGEMENT (OUTPATIENT)
Dept: CARE COORDINATION | Age: 86
End: 2021-01-01

## 2021-01-01 ENCOUNTER — MED REC SCAN ONLY (OUTPATIENT)
Dept: INTERNAL MEDICINE CLINIC | Facility: CLINIC | Age: 86
End: 2021-01-01

## 2021-01-01 ENCOUNTER — APPOINTMENT (OUTPATIENT)
Dept: CT IMAGING | Facility: HOSPITAL | Age: 86
End: 2021-01-01
Attending: EMERGENCY MEDICINE
Payer: MEDICARE

## 2021-01-01 ENCOUNTER — LAB ENCOUNTER (OUTPATIENT)
Dept: LAB | Facility: HOSPITAL | Age: 86
End: 2021-01-01
Attending: Other
Payer: MEDICARE

## 2021-01-01 ENCOUNTER — APPOINTMENT (OUTPATIENT)
Dept: GENERAL RADIOLOGY | Age: 86
DRG: 689 | End: 2021-01-01
Attending: INTERNAL MEDICINE

## 2021-01-01 ENCOUNTER — HOSPITAL ENCOUNTER (INPATIENT)
Age: 86
LOS: 5 days | Discharge: HOME | DRG: 689 | End: 2021-03-26
Attending: EMERGENCY MEDICINE | Admitting: INTERNAL MEDICINE

## 2021-01-01 ENCOUNTER — APPOINTMENT (OUTPATIENT)
Dept: CT IMAGING | Age: 86
DRG: 689 | End: 2021-01-01
Attending: INTERNAL MEDICINE

## 2021-01-01 VITALS
DIASTOLIC BLOOD PRESSURE: 58 MMHG | SYSTOLIC BLOOD PRESSURE: 116 MMHG | OXYGEN SATURATION: 95 % | HEIGHT: 70 IN | RESPIRATION RATE: 16 BRPM | WEIGHT: 157.41 LBS | BODY MASS INDEX: 22.54 KG/M2 | TEMPERATURE: 98.2 F | HEART RATE: 65 BPM

## 2021-01-01 VITALS
BODY MASS INDEX: 26.78 KG/M2 | HEIGHT: 67 IN | SYSTOLIC BLOOD PRESSURE: 125 MMHG | WEIGHT: 170.64 LBS | OXYGEN SATURATION: 95 % | RESPIRATION RATE: 16 BRPM | TEMPERATURE: 97.7 F | HEART RATE: 63 BPM | DIASTOLIC BLOOD PRESSURE: 57 MMHG

## 2021-01-01 DIAGNOSIS — N39.0 URINARY TRACT INFECTION WITHOUT HEMATURIA, SITE UNSPECIFIED: ICD-10-CM

## 2021-01-01 DIAGNOSIS — I48.91 ATRIAL FIBRILLATION, UNSPECIFIED TYPE (HCC): ICD-10-CM

## 2021-01-01 DIAGNOSIS — Z20.822 EXPOSURE TO COVID-19 VIRUS: Primary | ICD-10-CM

## 2021-01-01 DIAGNOSIS — R46.89 COMBATIVE BEHAVIOR: Primary | ICD-10-CM

## 2021-01-01 DIAGNOSIS — Z11.1 TUBERCULOSIS SCREENING: ICD-10-CM

## 2021-01-01 DIAGNOSIS — G30.1 LATE ONSET ALZHEIMER'S DISEASE WITHOUT BEHAVIORAL DISTURBANCE (HCC): ICD-10-CM

## 2021-01-01 DIAGNOSIS — N17.9 ACUTE RENAL FAILURE SUPERIMPOSED ON CHRONIC KIDNEY DISEASE, UNSPECIFIED CKD STAGE, UNSPECIFIED ACUTE RENAL FAILURE TYPE: ICD-10-CM

## 2021-01-01 DIAGNOSIS — F03.91 DEMENTIA WITH BEHAVIORAL DISTURBANCE, UNSPECIFIED DEMENTIA TYPE: Primary | ICD-10-CM

## 2021-01-01 DIAGNOSIS — N18.9 ACUTE RENAL FAILURE SUPERIMPOSED ON CHRONIC KIDNEY DISEASE, UNSPECIFIED CKD STAGE, UNSPECIFIED ACUTE RENAL FAILURE TYPE: ICD-10-CM

## 2021-01-01 DIAGNOSIS — Z23 NEED FOR VACCINATION: ICD-10-CM

## 2021-01-01 DIAGNOSIS — Z11.52 ENCOUNTER FOR SCREENING FOR COVID-19: Primary | ICD-10-CM

## 2021-01-01 DIAGNOSIS — F02.80 LATE ONSET ALZHEIMER'S DISEASE WITHOUT BEHAVIORAL DISTURBANCE (HCC): ICD-10-CM

## 2021-01-01 LAB
ALBUMIN SERPL-MCNC: 2.3 G/DL (ref 3.6–5.1)
ALBUMIN SERPL-MCNC: 2.3 G/DL (ref 3.6–5.1)
ALBUMIN SERPL-MCNC: 2.4 G/DL (ref 3.6–5.1)
ALBUMIN SERPL-MCNC: 2.5 G/DL (ref 3.6–5.1)
ALBUMIN SERPL-MCNC: 2.6 G/DL (ref 3.6–5.1)
ALBUMIN SERPL-MCNC: 3.1 G/DL (ref 3.6–5.1)
ALBUMIN/GLOB SERPL: 0.7 {RATIO} (ref 1–2.4)
ALP SERPL-CCNC: 46 UNITS/L (ref 45–117)
ALP SERPL-CCNC: 56 UNITS/L (ref 45–117)
ALT SERPL-CCNC: 17 UNITS/L
ALT SERPL-CCNC: 18 UNITS/L
AMPHETAMINES UR QL SCN>500 NG/ML: NEGATIVE
AMPHETAMINES UR QL SCN>500 NG/ML: NEGATIVE
ANION GAP SERPL CALC-SCNC: 10 MMOL/L (ref 10–20)
ANION GAP SERPL CALC-SCNC: 11 MMOL/L (ref 10–20)
ANION GAP SERPL CALC-SCNC: 12 MMOL/L (ref 10–20)
ANION GAP SERPL CALC-SCNC: 7 MMOL/L (ref 10–20)
ANION GAP SERPL CALC-SCNC: 8 MMOL/L (ref 10–20)
ANION GAP SERPL CALC-SCNC: 9 MMOL/L (ref 10–20)
ANION GAP SERPL CALC-SCNC: 9 MMOL/L (ref 10–20)
APPEARANCE UR: ABNORMAL
APPEARANCE UR: CLEAR
AST SERPL-CCNC: 12 UNITS/L
AST SERPL-CCNC: 15 UNITS/L
ATRIAL RATE (BPM): 59
ATRIAL RATE (BPM): 81
BACTERIA #/AREA URNS HPF: ABNORMAL /HPF
BACTERIA UR CULT: NORMAL
BACTERIA UR QL AUTO: NEGATIVE /HPF
BARBITURATES UR QL SCN>200 NG/ML: NEGATIVE
BARBITURATES UR QL SCN>200 NG/ML: NEGATIVE
BASOPHILS # BLD: 0 K/MCL (ref 0–0.3)
BASOPHILS # BLD: 0.1 K/MCL (ref 0–0.3)
BASOPHILS # BLD: 0.1 K/MCL (ref 0–0.3)
BASOPHILS NFR BLD: 1 %
BENZODIAZ UR QL SCN>200 NG/ML: NEGATIVE
BENZODIAZ UR QL SCN>200 NG/ML: NEGATIVE
BILIRUB CONJ SERPL-MCNC: 0.2 MG/DL (ref 0–0.2)
BILIRUB SERPL-MCNC: 0.4 MG/DL (ref 0.2–1)
BILIRUB SERPL-MCNC: 0.5 MG/DL (ref 0.2–1)
BILIRUB UR QL STRIP: NEGATIVE
BILIRUB UR QL STRIP: NEGATIVE
BILIRUB UR QL: NEGATIVE
BUN SERPL-MCNC: 32 MG/DL (ref 6–20)
BUN SERPL-MCNC: 34 MG/DL (ref 6–20)
BUN SERPL-MCNC: 36 MG/DL (ref 6–20)
BUN SERPL-MCNC: 38 MG/DL (ref 6–20)
BUN SERPL-MCNC: 40 MG/DL (ref 6–20)
BUN SERPL-MCNC: 55 MG/DL (ref 6–20)
BUN SERPL-MCNC: 61 MG/DL (ref 6–20)
BUN/CREAT SERPL: 19 (ref 7–25)
BUN/CREAT SERPL: 20 (ref 7–25)
BUN/CREAT SERPL: 21 (ref 7–25)
BUN/CREAT SERPL: 21 (ref 7–25)
BUN/CREAT SERPL: 25 (ref 7–25)
BUN/CREAT SERPL: 30 (ref 7–25)
BUN/CREAT SERPL: 31 (ref 7–25)
BZE UR QL SCN>150 NG/ML: NEGATIVE
BZE UR QL SCN>150 NG/ML: NEGATIVE
CALCIUM SERPL-MCNC: 8.1 MG/DL (ref 8.4–10.2)
CALCIUM SERPL-MCNC: 8.3 MG/DL (ref 8.4–10.2)
CALCIUM SERPL-MCNC: 8.4 MG/DL (ref 8.4–10.2)
CALCIUM SERPL-MCNC: 8.5 MG/DL (ref 8.4–10.2)
CALCIUM SERPL-MCNC: 8.6 MG/DL (ref 8.4–10.2)
CALCIUM SERPL-MCNC: 9 MG/DL (ref 8.4–10.2)
CALCIUM SERPL-MCNC: 9.4 MG/DL (ref 8.4–10.2)
CANNABINOIDS UR QL SCN>50 NG/ML: NEGATIVE
CANNABINOIDS UR QL SCN>50 NG/ML: NEGATIVE
CHLORIDE SERPL-SCNC: 108 MMOL/L (ref 98–107)
CHLORIDE SERPL-SCNC: 109 MMOL/L (ref 98–107)
CHLORIDE SERPL-SCNC: 110 MMOL/L (ref 98–107)
CHLORIDE SERPL-SCNC: 111 MMOL/L (ref 98–107)
CHLORIDE SERPL-SCNC: 113 MMOL/L (ref 98–107)
CHLORIDE SERPL-SCNC: 114 MMOL/L (ref 98–107)
CHLORIDE SERPL-SCNC: 114 MMOL/L (ref 98–107)
CLARITY UR: CLEAR
CO2 SERPL-SCNC: 23 MMOL/L (ref 21–32)
CO2 SERPL-SCNC: 24 MMOL/L (ref 21–32)
CO2 SERPL-SCNC: 25 MMOL/L (ref 21–32)
CO2 SERPL-SCNC: 26 MMOL/L (ref 21–32)
CO2 SERPL-SCNC: 27 MMOL/L (ref 21–32)
COLOR UR: YELLOW
CREAT SERPL-MCNC: 1.42 MG/DL (ref 0.67–1.17)
CREAT SERPL-MCNC: 1.59 MG/DL (ref 0.67–1.17)
CREAT SERPL-MCNC: 1.79 MG/DL (ref 0.67–1.17)
CREAT SERPL-MCNC: 1.8 MG/DL (ref 0.67–1.17)
CREAT SERPL-MCNC: 1.83 MG/DL (ref 0.67–1.17)
CREAT SERPL-MCNC: 1.88 MG/DL (ref 0.67–1.17)
CREAT SERPL-MCNC: 2.05 MG/DL (ref 0.67–1.17)
DEPRECATED RDW RBC: 50.9 FL (ref 39–50)
DEPRECATED RDW RBC: 51.2 FL (ref 39–50)
DEPRECATED RDW RBC: 53.1 FL (ref 39–50)
EOSINOPHIL # BLD: 0.2 K/MCL (ref 0–0.5)
EOSINOPHIL # BLD: 0.2 K/MCL (ref 0–0.5)
EOSINOPHIL # BLD: 0.3 K/MCL (ref 0–0.5)
EOSINOPHIL NFR BLD: 2 %
EOSINOPHIL NFR BLD: 2 %
EOSINOPHIL NFR BLD: 4 %
ERYTHROCYTE [DISTWIDTH] IN BLOOD: 14.6 % (ref 11–15)
ERYTHROCYTE [DISTWIDTH] IN BLOOD: 14.6 % (ref 11–15)
ERYTHROCYTE [DISTWIDTH] IN BLOOD: 14.8 % (ref 11–15)
ETHANOL SERPL-MCNC: NORMAL MG/DL
ETHANOL SERPL-MCNC: NORMAL MG/DL
FASTING DURATION TIME PATIENT: ABNORMAL H
FOLATE SERPL-MCNC: >20 NG/ML (ref 8.7–?)
GFR SERPLBLD BASED ON 1.73 SQ M-ARVRAT: 27 ML/MIN/1.73M2
GFR SERPLBLD BASED ON 1.73 SQ M-ARVRAT: 30 ML/MIN/1.73M2
GFR SERPLBLD BASED ON 1.73 SQ M-ARVRAT: 31 ML/MIN/1.73M2
GFR SERPLBLD BASED ON 1.73 SQ M-ARVRAT: 32 ML/MIN/1.73M2
GFR SERPLBLD BASED ON 1.73 SQ M-ARVRAT: 32 ML/MIN/1.73M2
GFR SERPLBLD BASED ON 1.73 SQ M-ARVRAT: 37 ML/MIN/1.73M2
GFR SERPLBLD BASED ON 1.73 SQ M-ARVRAT: 42 ML/MIN/1.73M2
GLOBULIN SER-MCNC: 3.6 G/DL (ref 2–4)
GLUCOSE SERPL-MCNC: 105 MG/DL (ref 65–99)
GLUCOSE SERPL-MCNC: 106 MG/DL (ref 65–99)
GLUCOSE SERPL-MCNC: 118 MG/DL (ref 65–99)
GLUCOSE SERPL-MCNC: 120 MG/DL (ref 65–99)
GLUCOSE SERPL-MCNC: 130 MG/DL (ref 65–99)
GLUCOSE SERPL-MCNC: 149 MG/DL (ref 65–99)
GLUCOSE SERPL-MCNC: 99 MG/DL (ref 65–99)
GLUCOSE UR STRIP-MCNC: NEGATIVE MG/DL
GLUCOSE UR STRIP-MCNC: NEGATIVE MG/DL
GLUCOSE UR-MCNC: NEGATIVE MG/DL
HCT VFR BLD CALC: 32 % (ref 39–51)
HCT VFR BLD CALC: 35 % (ref 39–51)
HCT VFR BLD CALC: 41.3 % (ref 39–51)
HGB BLD-MCNC: 10 G/DL (ref 13–17)
HGB BLD-MCNC: 11.1 G/DL (ref 13–17)
HGB BLD-MCNC: 12.8 G/DL (ref 13–17)
HGB UR QL STRIP.AUTO: NEGATIVE
HGB UR QL STRIP: NEGATIVE
HGB UR QL STRIP: NEGATIVE
HYALINE CASTS #/AREA URNS AUTO: 3 /LPF
HYALINE CASTS #/AREA URNS LPF: ABNORMAL /LPF
IMM GRANULOCYTES # BLD AUTO: 0 K/MCL (ref 0–0.2)
IMM GRANULOCYTES # BLD AUTO: 0.1 K/MCL (ref 0–0.2)
IMM GRANULOCYTES # BLD AUTO: 0.1 K/MCL (ref 0–0.2)
IMM GRANULOCYTES # BLD: 0 %
IMM GRANULOCYTES # BLD: 1 %
IMM GRANULOCYTES # BLD: 1 %
INR PPP: 1.2
INR: 2.2 (ref 0.8–1.2)
INR: 3 (ref 0.8–1.2)
KETONES UR STRIP-MCNC: NEGATIVE MG/DL
KETONES UR STRIP-MCNC: NEGATIVE MG/DL
KETONES UR-MCNC: NEGATIVE MG/DL
LEUKOCYTE ESTERASE UR QL STRIP.AUTO: NEGATIVE
LEUKOCYTE ESTERASE UR QL STRIP: ABNORMAL
LEUKOCYTE ESTERASE UR QL STRIP: NEGATIVE
LYMPHOCYTES # BLD: 0.9 K/MCL (ref 1–4)
LYMPHOCYTES # BLD: 0.9 K/MCL (ref 1–4)
LYMPHOCYTES # BLD: 1.7 K/MCL (ref 1–4)
LYMPHOCYTES NFR BLD: 11 %
LYMPHOCYTES NFR BLD: 14 %
LYMPHOCYTES NFR BLD: 22 %
M TB IFN-G CD4+ T-CELLS BLD-ACNC: 0.05 IU/ML
M TB TUBERC IFN-G BLD QL: NEGATIVE
M TB TUBERC IGNF/MITOGEN IGNF CONTROL: >10 IU/ML
MAGNESIUM SERPL-MCNC: 2 MG/DL (ref 1.7–2.4)
MAGNESIUM SERPL-MCNC: 2.2 MG/DL (ref 1.7–2.4)
MCH RBC QN AUTO: 29.9 PG (ref 26–34)
MCH RBC QN AUTO: 30 PG (ref 26–34)
MCH RBC QN AUTO: 30.2 PG (ref 26–34)
MCHC RBC AUTO-ENTMCNC: 31 G/DL (ref 32–36.5)
MCHC RBC AUTO-ENTMCNC: 31.3 G/DL (ref 32–36.5)
MCHC RBC AUTO-ENTMCNC: 31.7 G/DL (ref 32–36.5)
MCV RBC AUTO: 95.1 FL (ref 78–100)
MCV RBC AUTO: 95.8 FL (ref 78–100)
MCV RBC AUTO: 96.9 FL (ref 78–100)
MONOCYTES # BLD: 0.7 K/MCL (ref 0.3–0.9)
MONOCYTES # BLD: 0.8 K/MCL (ref 0.3–0.9)
MONOCYTES # BLD: 0.8 K/MCL (ref 0.3–0.9)
MONOCYTES NFR BLD: 11 %
MONOCYTES NFR BLD: 9 %
MONOCYTES NFR BLD: 9 %
NEUTROPHILS # BLD: 4.6 K/MCL (ref 1.8–7.7)
NEUTROPHILS # BLD: 4.9 K/MCL (ref 1.8–7.7)
NEUTROPHILS # BLD: 6.3 K/MCL (ref 1.8–7.7)
NEUTROPHILS NFR BLD: 66 %
NEUTROPHILS NFR BLD: 69 %
NEUTROPHILS NFR BLD: 76 %
NITRITE UR QL STRIP.AUTO: NEGATIVE
NITRITE UR QL STRIP: NEGATIVE
NITRITE UR QL STRIP: NEGATIVE
NRBC BLD MANUAL-RTO: 0 /100 WBC
OPIATES UR QL SCN>300 NG/ML: NEGATIVE
OPIATES UR QL SCN>300 NG/ML: NEGATIVE
P AXIS (DEGREES): 29
P AXIS (DEGREES): 56
PCP UR QL SCN>25 NG/ML: NEGATIVE
PCP UR QL SCN>25 NG/ML: NEGATIVE
PH UR STRIP: 5 [PH] (ref 5–7)
PH UR STRIP: 5 [PH] (ref 5–7)
PH UR: 6 [PH] (ref 5–8)
PHOSPHATE SERPL-MCNC: 2.5 MG/DL (ref 2.4–4.7)
PHOSPHATE SERPL-MCNC: 2.8 MG/DL (ref 2.4–4.7)
PHOSPHATE SERPL-MCNC: 3.5 MG/DL (ref 2.4–4.7)
PHOSPHATE SERPL-MCNC: 3.6 MG/DL (ref 2.4–4.7)
PLATELET # BLD AUTO: 171 K/MCL (ref 140–450)
PLATELET # BLD AUTO: 186 K/MCL (ref 140–450)
PLATELET # BLD AUTO: 202 K/MCL (ref 140–450)
POTASSIUM SERPL-SCNC: 3.9 MMOL/L (ref 3.4–5.1)
POTASSIUM SERPL-SCNC: 3.9 MMOL/L (ref 3.4–5.1)
POTASSIUM SERPL-SCNC: 4.4 MMOL/L (ref 3.4–5.1)
POTASSIUM SERPL-SCNC: 4.6 MMOL/L (ref 3.4–5.1)
POTASSIUM SERPL-SCNC: 4.8 MMOL/L (ref 3.4–5.1)
PR-INTERVAL (MSEC): 194
PR-INTERVAL (MSEC): 230
PROT SERPL-MCNC: 6.2 G/DL (ref 6.4–8.2)
PROT SERPL-MCNC: 7.2 G/DL (ref 6.4–8.2)
PROT UR STRIP-MCNC: NEGATIVE MG/DL
PROT UR STRIP-MCNC: NEGATIVE MG/DL
PROT UR-MCNC: NEGATIVE MG/DL
PROTHROMBIN TIME: 12.8 SEC (ref 9.7–11.8)
QRS-INTERVAL (MSEC): 144
QRS-INTERVAL (MSEC): 152
QT-INTERVAL (MSEC): 420
QT-INTERVAL (MSEC): 478
QTC: 473
QTC: 487
QUANTIFERON TB1 MINUS NIL: 0.01 IU/ML
QUANTIFERON TB2 MINUS NIL: 0 IU/ML
R AXIS (DEGREES): -57
R AXIS (DEGREES): -72
RAINBOW EXTRA TUBES HOLD SPECIMEN: NORMAL
RBC # BLD: 3.34 MIL/MCL (ref 4.5–5.9)
RBC # BLD: 3.68 MIL/MCL (ref 4.5–5.9)
RBC # BLD: 4.26 MIL/MCL (ref 4.5–5.9)
RBC #/AREA URNS AUTO: <1 /HPF
RBC #/AREA URNS HPF: ABNORMAL /HPF
REPORT TEXT: NORMAL
REPORT TEXT: NORMAL
SARS-COV-2 RNA RESP QL NAA+PROBE: NOT DETECTED
SERVICE CMNT-IMP: NORMAL
SODIUM SERPL-SCNC: 139 MMOL/L (ref 135–145)
SODIUM SERPL-SCNC: 140 MMOL/L (ref 135–145)
SODIUM SERPL-SCNC: 141 MMOL/L (ref 135–145)
SODIUM SERPL-SCNC: 141 MMOL/L (ref 135–145)
SODIUM SERPL-SCNC: 142 MMOL/L (ref 135–145)
SODIUM SERPL-SCNC: 142 MMOL/L (ref 135–145)
SODIUM SERPL-SCNC: 143 MMOL/L (ref 135–145)
SP GR UR STRIP: 1.01 (ref 1–1.03)
SP GR UR STRIP: 1.02 (ref 1–1.03)
SP GR UR STRIP: 1.02 (ref 1–1.03)
SQUAMOUS #/AREA URNS HPF: ABNORMAL /HPF
T AXIS (DEGREES): -14
T AXIS (DEGREES): 7
T PALLIDUM AB SER QL: NEGATIVE
T4 FREE SERPL-MCNC: 0.8 NG/DL (ref 0.8–1.7)
T4 FREE SERPL-MCNC: 1 NG/DL (ref 0.8–1.5)
TROPONIN I SERPL HS-MCNC: <0.02 NG/ML
TSH SERPL-ACNC: 5.26 MCUNITS/ML (ref 0.35–5)
TSI SER-ACNC: 7.23 MIU/ML (ref 0.36–3.74)
UROBILINOGEN UR STRIP-ACNC: <2
UROBILINOGEN UR STRIP-MCNC: 0.2 MG/DL
UROBILINOGEN UR STRIP-MCNC: 0.2 MG/DL
VENTRICULAR RATE EKG/MIN (BPM): 59
VENTRICULAR RATE EKG/MIN (BPM): 81
VIT B12 SERPL-MCNC: >2000 PG/ML (ref 193–986)
WBC # BLD: 6.6 K/MCL (ref 4.2–11)
WBC # BLD: 7.5 K/MCL (ref 4.2–11)
WBC # BLD: 8.3 K/MCL (ref 4.2–11)
WBC #/AREA URNS AUTO: 1 /HPF
WBC #/AREA URNS HPF: ABNORMAL /HPF

## 2021-01-01 PROCEDURE — 97161 PT EVAL LOW COMPLEX 20 MIN: CPT

## 2021-01-01 PROCEDURE — 86480 TB TEST CELL IMMUN MEASURE: CPT

## 2021-01-01 PROCEDURE — 90839 PSYTX CRISIS INITIAL 60 MIN: CPT

## 2021-01-01 PROCEDURE — 10000002 HB ROOM CHARGE MED SURG

## 2021-01-01 PROCEDURE — 10002803 HB RX 637: Performed by: PSYCHIATRY & NEUROLOGY

## 2021-01-01 PROCEDURE — 97116 GAIT TRAINING THERAPY: CPT | Performed by: PHYSICAL THERAPIST

## 2021-01-01 PROCEDURE — 96375 TX/PRO/DX INJ NEW DRUG ADDON: CPT

## 2021-01-01 PROCEDURE — 71045 X-RAY EXAM CHEST 1 VIEW: CPT

## 2021-01-01 PROCEDURE — 10002807 HB RX 258: Performed by: INTERNAL MEDICINE

## 2021-01-01 PROCEDURE — 10002807 HB RX 258: Performed by: PSYCHIATRY & NEUROLOGY

## 2021-01-01 PROCEDURE — 10002803 HB RX 637: Performed by: INTERNAL MEDICINE

## 2021-01-01 PROCEDURE — 85025 COMPLETE CBC W/AUTO DIFF WBC: CPT | Performed by: EMERGENCY MEDICINE

## 2021-01-01 PROCEDURE — 80076 HEPATIC FUNCTION PANEL: CPT | Performed by: EMERGENCY MEDICINE

## 2021-01-01 PROCEDURE — 80048 BASIC METABOLIC PNL TOTAL CA: CPT | Performed by: EMERGENCY MEDICINE

## 2021-01-01 PROCEDURE — 80069 RENAL FUNCTION PANEL: CPT | Performed by: INTERNAL MEDICINE

## 2021-01-01 PROCEDURE — 99285 EMERGENCY DEPT VISIT HI MDM: CPT

## 2021-01-01 PROCEDURE — 80307 DRUG TEST PRSMV CHEM ANLYZR: CPT | Performed by: EMERGENCY MEDICINE

## 2021-01-01 PROCEDURE — 97116 GAIT TRAINING THERAPY: CPT

## 2021-01-01 PROCEDURE — 10004651 HB RX, NO CHARGE ITEM: Performed by: INTERNAL MEDICINE

## 2021-01-01 PROCEDURE — 10002800 HB RX 250 W HCPCS: Performed by: PSYCHIATRY & NEUROLOGY

## 2021-01-01 PROCEDURE — 81003 URINALYSIS AUTO W/O SCOPE: CPT | Performed by: EMERGENCY MEDICINE

## 2021-01-01 PROCEDURE — U0005 INFEC AGEN DETEC AMPLI PROBE: HCPCS | Performed by: EMERGENCY MEDICINE

## 2021-01-01 PROCEDURE — 85025 COMPLETE CBC W/AUTO DIFF WBC: CPT | Performed by: INTERNAL MEDICINE

## 2021-01-01 PROCEDURE — 36415 COLL VENOUS BLD VENIPUNCTURE: CPT

## 2021-01-01 PROCEDURE — G0378 HOSPITAL OBSERVATION PER HR: HCPCS

## 2021-01-01 PROCEDURE — 90840 PSYTX CRISIS EA ADDL 30 MIN: CPT

## 2021-01-01 PROCEDURE — 90792 PSYCH DIAG EVAL W/MED SRVCS: CPT | Performed by: PSYCHIATRY & NEUROLOGY

## 2021-01-01 PROCEDURE — 96365 THER/PROPH/DIAG IV INF INIT: CPT

## 2021-01-01 PROCEDURE — 86780 TREPONEMA PALLIDUM: CPT

## 2021-01-01 PROCEDURE — 10002800 HB RX 250 W HCPCS: Performed by: EMERGENCY MEDICINE

## 2021-01-01 PROCEDURE — 93005 ELECTROCARDIOGRAM TRACING: CPT | Performed by: EMERGENCY MEDICINE

## 2021-01-01 PROCEDURE — 36415 COLL VENOUS BLD VENIPUNCTURE: CPT | Performed by: INTERNAL MEDICINE

## 2021-01-01 PROCEDURE — 84443 ASSAY THYROID STIM HORMONE: CPT | Performed by: EMERGENCY MEDICINE

## 2021-01-01 PROCEDURE — 82077 ASSAY SPEC XCP UR&BREATH IA: CPT | Performed by: EMERGENCY MEDICINE

## 2021-01-01 PROCEDURE — 13003289 HB OXYGEN THERAPY DAILY

## 2021-01-01 PROCEDURE — 81001 URINALYSIS AUTO W/SCOPE: CPT | Performed by: EMERGENCY MEDICINE

## 2021-01-01 PROCEDURE — 92611 MOTION FLUOROSCOPY/SWALLOW: CPT

## 2021-01-01 PROCEDURE — 70450 CT HEAD/BRAIN W/O DYE: CPT | Performed by: EMERGENCY MEDICINE

## 2021-01-01 PROCEDURE — 10004281 HB COUNTER-STAFF TIME PER 15 MIN

## 2021-01-01 PROCEDURE — 80053 COMPREHEN METABOLIC PANEL: CPT | Performed by: INTERNAL MEDICINE

## 2021-01-01 PROCEDURE — 71250 CT THORAX DX C-: CPT

## 2021-01-01 PROCEDURE — 76775 US EXAM ABDO BACK WALL LIM: CPT

## 2021-01-01 PROCEDURE — 85610 PROTHROMBIN TIME: CPT | Performed by: INTERNAL MEDICINE

## 2021-01-01 PROCEDURE — 97530 THERAPEUTIC ACTIVITIES: CPT

## 2021-01-01 PROCEDURE — 84484 ASSAY OF TROPONIN QUANT: CPT | Performed by: EMERGENCY MEDICINE

## 2021-01-01 PROCEDURE — 84443 ASSAY THYROID STIM HORMONE: CPT

## 2021-01-01 PROCEDURE — 10002800 HB RX 250 W HCPCS: Performed by: INTERNAL MEDICINE

## 2021-01-01 PROCEDURE — 70450 CT HEAD/BRAIN W/O DYE: CPT

## 2021-01-01 PROCEDURE — 84439 ASSAY OF FREE THYROXINE: CPT

## 2021-01-01 PROCEDURE — 87635 SARS-COV-2 COVID-19 AMP PRB: CPT | Performed by: EMERGENCY MEDICINE

## 2021-01-01 PROCEDURE — 81001 URINALYSIS AUTO W/SCOPE: CPT

## 2021-01-01 PROCEDURE — 96360 HYDRATION IV INFUSION INIT: CPT

## 2021-01-01 PROCEDURE — 82746 ASSAY OF FOLIC ACID SERUM: CPT

## 2021-01-01 PROCEDURE — 84439 ASSAY OF FREE THYROXINE: CPT | Performed by: EMERGENCY MEDICINE

## 2021-01-01 PROCEDURE — U0003 INFECTIOUS AGENT DETECTION BY NUCLEIC ACID (DNA OR RNA); SEVERE ACUTE RESPIRATORY SYNDROME CORONAVIRUS 2 (SARS-COV-2) (CORONAVIRUS DISEASE [COVID-19]), AMPLIFIED PROBE TECHNIQUE, MAKING USE OF HIGH THROUGHPUT TECHNOLOGIES AS DESCRIBED BY CMS-2020-01-R: HCPCS | Performed by: EMERGENCY MEDICINE

## 2021-01-01 PROCEDURE — 74230 X-RAY XM SWLNG FUNCJ C+: CPT

## 2021-01-01 PROCEDURE — 92526 ORAL FUNCTION THERAPY: CPT

## 2021-01-01 PROCEDURE — 94640 AIRWAY INHALATION TREATMENT: CPT

## 2021-01-01 PROCEDURE — 10002807 HB RX 258: Performed by: EMERGENCY MEDICINE

## 2021-01-01 PROCEDURE — 71046 X-RAY EXAM CHEST 2 VIEWS: CPT

## 2021-01-01 PROCEDURE — C9803 HOPD COVID-19 SPEC COLLECT: HCPCS

## 2021-01-01 PROCEDURE — 10002801 HB RX 250 W/O HCPCS: Performed by: INTERNAL MEDICINE

## 2021-01-01 PROCEDURE — 96361 HYDRATE IV INFUSION ADD-ON: CPT

## 2021-01-01 PROCEDURE — 87086 URINE CULTURE/COLONY COUNT: CPT | Performed by: EMERGENCY MEDICINE

## 2021-01-01 PROCEDURE — 83735 ASSAY OF MAGNESIUM: CPT | Performed by: EMERGENCY MEDICINE

## 2021-01-01 PROCEDURE — 10002019 HB COUNTER RESP ASSESSMENT

## 2021-01-01 PROCEDURE — 82607 VITAMIN B-12: CPT

## 2021-01-01 PROCEDURE — 83735 ASSAY OF MAGNESIUM: CPT | Performed by: INTERNAL MEDICINE

## 2021-01-01 RX ORDER — QUETIAPINE FUMARATE 25 MG/1
25 TABLET, FILM COATED ORAL 3 TIMES DAILY
COMMUNITY

## 2021-01-01 RX ORDER — WARFARIN SODIUM 1 MG/1
1.25 TABLET ORAL DAILY
Status: DISCONTINUED | OUTPATIENT
Start: 2021-01-01 | End: 2021-01-01

## 2021-01-01 RX ORDER — MEMANTINE HYDROCHLORIDE 5 MG/1
5 TABLET ORAL DAILY
Status: DISCONTINUED | OUTPATIENT
Start: 2021-01-01 | End: 2021-01-01 | Stop reason: HOSPADM

## 2021-01-01 RX ORDER — MEMANTINE HYDROCHLORIDE 5 MG/1
5 TABLET ORAL DAILY
COMMUNITY

## 2021-01-01 RX ORDER — DONEPEZIL HYDROCHLORIDE 5 MG/1
5 TABLET, FILM COATED ORAL NIGHTLY
Qty: 90 TABLET | Refills: 3 | Status: SHIPPED
Start: 2021-01-01

## 2021-01-01 RX ORDER — QUETIAPINE FUMARATE 25 MG/1
25 TABLET, FILM COATED ORAL 3 TIMES DAILY
Status: DISCONTINUED | OUTPATIENT
Start: 2021-01-01 | End: 2021-01-01 | Stop reason: HOSPADM

## 2021-01-01 RX ORDER — CIPROFLOXACIN 500 MG/1
500 TABLET, FILM COATED ORAL DAILY
Qty: 5 TABLET | Refills: 0 | Status: SHIPPED | OUTPATIENT
Start: 2021-01-01 | End: 2021-01-01 | Stop reason: HOSPADM

## 2021-01-01 RX ORDER — ANTIOX #8/OM3/DHA/EPA/LUT/ZEAX 250-2.5 MG
1 CAPSULE ORAL 2 TIMES DAILY
Refills: 0 | COMMUNITY
Start: 2021-01-01 | End: 2021-01-01

## 2021-01-01 RX ORDER — HALOPERIDOL 5 MG/ML
2 INJECTION INTRAMUSCULAR EVERY 4 HOURS PRN
Status: DISCONTINUED | OUTPATIENT
Start: 2021-01-01 | End: 2021-01-01 | Stop reason: HOSPADM

## 2021-01-01 RX ORDER — CIPROFLOXACIN 500 MG/1
500 TABLET, FILM COATED ORAL DAILY
Status: DISCONTINUED | OUTPATIENT
Start: 2021-01-01 | End: 2021-01-01

## 2021-01-01 RX ORDER — LANOLIN ALCOHOL/MO/W.PET/CERES
1000 CREAM (GRAM) TOPICAL DAILY
COMMUNITY

## 2021-01-01 RX ORDER — WARFARIN SODIUM 1 MG/1
1.25 TABLET ORAL DAILY
Status: ON HOLD | COMMUNITY
End: 2021-01-01

## 2021-01-01 RX ORDER — WARFARIN SODIUM 2.5 MG/1
2.5 TABLET ORAL DAILY
Status: ON HOLD | COMMUNITY
End: 2021-01-01

## 2021-01-01 RX ORDER — QUETIAPINE FUMARATE 25 MG/1
25 TABLET, FILM COATED ORAL NIGHTLY
Status: DISCONTINUED | OUTPATIENT
Start: 2021-01-01 | End: 2021-01-01

## 2021-01-01 RX ORDER — LORAZEPAM 0.5 MG/1
0.5 TABLET ORAL EVERY 4 HOURS PRN
Status: ON HOLD | COMMUNITY
End: 2021-01-01 | Stop reason: HOSPADM

## 2021-01-01 RX ORDER — ALBUTEROL SULFATE 2.5 MG/3ML
2.5 SOLUTION RESPIRATORY (INHALATION)
Status: DISCONTINUED | OUTPATIENT
Start: 2021-01-01 | End: 2021-01-01 | Stop reason: HOSPADM

## 2021-01-01 RX ORDER — CEFAZOLIN SODIUM/WATER 1 G/10 ML
1000 SYRINGE (ML) INTRAVENOUS ONCE
Status: COMPLETED | OUTPATIENT
Start: 2021-01-01 | End: 2021-01-01

## 2021-01-01 RX ORDER — METOPROLOL SUCCINATE 25 MG/1
25 TABLET, EXTENDED RELEASE ORAL 2 TIMES DAILY
COMMUNITY

## 2021-01-01 RX ORDER — CEFAZOLIN SODIUM/WATER 2 G/20 ML
2000 SYRINGE (ML) INTRAVENOUS EVERY 24 HOURS
Status: DISCONTINUED | OUTPATIENT
Start: 2021-01-01 | End: 2021-01-01

## 2021-01-01 RX ORDER — METOPROLOL SUCCINATE 25 MG/1
25 TABLET, EXTENDED RELEASE ORAL 2 TIMES DAILY
Status: DISCONTINUED | OUTPATIENT
Start: 2021-01-01 | End: 2021-01-01 | Stop reason: HOSPADM

## 2021-01-01 RX ORDER — DEXTROSE, SODIUM CHLORIDE, SODIUM LACTATE, POTASSIUM CHLORIDE, AND CALCIUM CHLORIDE 5; .6; .31; .03; .02 G/100ML; G/100ML; G/100ML; G/100ML; G/100ML
INJECTION, SOLUTION INTRAVENOUS CONTINUOUS
Status: DISCONTINUED | OUTPATIENT
Start: 2021-01-01 | End: 2021-01-01

## 2021-01-01 RX ORDER — 0.9 % SODIUM CHLORIDE 0.9 %
2 VIAL (ML) INJECTION EVERY 12 HOURS SCHEDULED
Status: DISCONTINUED | OUTPATIENT
Start: 2021-01-01 | End: 2021-01-01 | Stop reason: HOSPADM

## 2021-01-01 RX ORDER — ALBUTEROL SULFATE 2.5 MG/3ML
SOLUTION RESPIRATORY (INHALATION)
Status: DISPENSED
Start: 2021-01-01 | End: 2021-01-01

## 2021-01-01 RX ORDER — DEXTROSE MONOHYDRATE, SODIUM CHLORIDE, AND POTASSIUM CHLORIDE 50; 1.49; 4.5 G/1000ML; G/1000ML; G/1000ML
INJECTION, SOLUTION INTRAVENOUS CONTINUOUS
Status: DISCONTINUED | OUTPATIENT
Start: 2021-01-01 | End: 2021-01-01

## 2021-01-01 RX ORDER — MULTIVIT-MIN/IRON/FOLIC ACID/K 18-600-40
2000 CAPSULE ORAL DAILY
Qty: 90 CAPSULE | Refills: 3 | Status: SHIPPED
Start: 2021-01-01 | End: 2021-01-01

## 2021-01-01 RX ORDER — MULTIVITAMIN,THER AND MINERALS
1 TABLET ORAL
Status: DISCONTINUED | OUTPATIENT
Start: 2021-01-01 | End: 2021-01-01 | Stop reason: HOSPADM

## 2021-01-01 RX ORDER — MULTIVITAMIN,THER AND MINERALS
1 TABLET ORAL DAILY
Status: DISCONTINUED | OUTPATIENT
Start: 2021-01-01 | End: 2021-01-01

## 2021-01-01 RX ORDER — MULTIVITAMIN,THER AND MINERALS
1 TABLET ORAL DAILY
COMMUNITY

## 2021-01-01 RX ORDER — QUETIAPINE 25 MG/1
25 TABLET, FILM COATED ORAL NIGHTLY
Qty: 90 TABLET | Refills: 3 | Status: SHIPPED
Start: 2021-01-01

## 2021-01-01 RX ORDER — SULFAMETHOXAZOLE AND TRIMETHOPRIM 400; 80 MG/1; MG/1
1 TABLET ORAL EVERY 12 HOURS SCHEDULED
Status: DISCONTINUED | OUTPATIENT
Start: 2021-01-01 | End: 2021-01-01 | Stop reason: HOSPADM

## 2021-01-01 RX ORDER — CHOLECALCIFEROL (VITAMIN D3) 125 MCG
5000 CAPSULE ORAL DAILY
Qty: 90 CAPSULE | Refills: 3 | Status: SHIPPED
Start: 2021-01-01

## 2021-01-01 RX ORDER — DONEPEZIL HYDROCHLORIDE 5 MG/1
5 TABLET, FILM COATED ORAL NIGHTLY
COMMUNITY

## 2021-01-01 RX ORDER — QUETIAPINE FUMARATE 25 MG/1
25 TABLET, FILM COATED ORAL EVERY 12 HOURS SCHEDULED
Status: DISCONTINUED | OUTPATIENT
Start: 2021-01-01 | End: 2021-01-01 | Stop reason: DRUGHIGH

## 2021-01-01 RX ORDER — DONEPEZIL HYDROCHLORIDE 5 MG/1
5 TABLET, FILM COATED ORAL NIGHTLY
Status: DISCONTINUED | OUTPATIENT
Start: 2021-01-01 | End: 2021-01-01 | Stop reason: HOSPADM

## 2021-01-01 RX ORDER — CEFAZOLIN SODIUM/WATER 1 G/10 ML
1000 SYRINGE (ML) INTRAVENOUS EVERY 24 HOURS
Status: DISCONTINUED | OUTPATIENT
Start: 2021-01-01 | End: 2021-01-01

## 2021-01-01 RX ORDER — QUETIAPINE FUMARATE 25 MG/1
25 TABLET, FILM COATED ORAL NIGHTLY
Status: DISCONTINUED | OUTPATIENT
Start: 2021-01-01 | End: 2021-01-01 | Stop reason: HOSPADM

## 2021-01-01 RX ADMIN — APIXABAN 5 MG: 5 TABLET, FILM COATED ORAL at 00:03

## 2021-01-01 RX ADMIN — HALOPERIDOL LACTATE 2 MG: 5 INJECTION, SOLUTION INTRAMUSCULAR at 11:54

## 2021-01-01 RX ADMIN — ALBUTEROL SULFATE 2.5 MG: 2.5 SOLUTION RESPIRATORY (INHALATION) at 13:44

## 2021-01-01 RX ADMIN — SODIUM CHLORIDE, PRESERVATIVE FREE 2 ML: 5 INJECTION INTRAVENOUS at 20:45

## 2021-01-01 RX ADMIN — MEMANTINE HYDROCHLORIDE 5 MG: 10 TABLET, FILM COATED ORAL at 10:33

## 2021-01-01 RX ADMIN — SODIUM CHLORIDE, PRESERVATIVE FREE 2 ML: 5 INJECTION INTRAVENOUS at 08:24

## 2021-01-01 RX ADMIN — QUETIAPINE 25 MG: 25 TABLET, FILM COATED ORAL at 20:44

## 2021-01-01 RX ADMIN — Medication 5000 UNITS: at 09:12

## 2021-01-01 RX ADMIN — SULFAMETHOXAZOLE AND TRIMETHOPRIM 1 TABLET: 400; 80 TABLET ORAL at 21:35

## 2021-01-01 RX ADMIN — THIAMINE HYDROCHLORIDE 400 MG: 100 INJECTION, SOLUTION INTRAMUSCULAR; INTRAVENOUS at 08:49

## 2021-01-01 RX ADMIN — QUETIAPINE 25 MG: 25 TABLET, FILM COATED ORAL at 13:23

## 2021-01-01 RX ADMIN — METOPROLOL SUCCINATE 25 MG: 25 TABLET, EXTENDED RELEASE ORAL at 21:40

## 2021-01-01 RX ADMIN — DONEPEZIL HYDROCHLORIDE 5 MG: 5 TABLET, FILM COATED ORAL at 22:46

## 2021-01-01 RX ADMIN — Medication 1000 MCG: at 09:12

## 2021-01-01 RX ADMIN — Medication 1000 MCG: at 10:39

## 2021-01-01 RX ADMIN — DONEPEZIL HYDROCHLORIDE 5 MG: 5 TABLET, FILM COATED ORAL at 20:44

## 2021-01-01 RX ADMIN — Medication 5000 UNITS: at 08:50

## 2021-01-01 RX ADMIN — QUETIAPINE 25 MG: 25 TABLET, FILM COATED ORAL at 13:54

## 2021-01-01 RX ADMIN — QUETIAPINE 25 MG: 25 TABLET, FILM COATED ORAL at 14:01

## 2021-01-01 RX ADMIN — DONEPEZIL HYDROCHLORIDE 5 MG: 5 TABLET, FILM COATED ORAL at 21:14

## 2021-01-01 RX ADMIN — DEXTROSE, SODIUM CHLORIDE, SODIUM LACTATE, POTASSIUM CHLORIDE, AND CALCIUM CHLORIDE: 5; .6; .31; .03; .02 INJECTION, SOLUTION INTRAVENOUS at 06:05

## 2021-01-01 RX ADMIN — QUETIAPINE 25 MG: 25 TABLET, FILM COATED ORAL at 08:46

## 2021-01-01 RX ADMIN — THIAMINE HYDROCHLORIDE 400 MG: 100 INJECTION, SOLUTION INTRAMUSCULAR; INTRAVENOUS at 20:41

## 2021-01-01 RX ADMIN — SULFAMETHOXAZOLE AND TRIMETHOPRIM 1 TABLET: 400; 80 TABLET ORAL at 08:17

## 2021-01-01 RX ADMIN — METOPROLOL SUCCINATE 25 MG: 25 TABLET, EXTENDED RELEASE ORAL at 08:57

## 2021-01-01 RX ADMIN — CIPROFLOXACIN HYDROCHLORIDE 500 MG: 500 TABLET, FILM COATED ORAL at 17:02

## 2021-01-01 RX ADMIN — METOPROLOL SUCCINATE 25 MG: 25 TABLET, EXTENDED RELEASE ORAL at 09:13

## 2021-01-01 RX ADMIN — QUETIAPINE 25 MG: 25 TABLET, FILM COATED ORAL at 21:40

## 2021-01-01 RX ADMIN — APIXABAN 5 MG: 5 TABLET, FILM COATED ORAL at 10:32

## 2021-01-01 RX ADMIN — QUETIAPINE 25 MG: 25 TABLET, FILM COATED ORAL at 08:23

## 2021-01-01 RX ADMIN — MULTIPLE VITAMINS W/ MINERALS TAB 1 TABLET: TAB at 17:41

## 2021-01-01 RX ADMIN — SODIUM CHLORIDE, PRESERVATIVE FREE 2 ML: 5 INJECTION INTRAVENOUS at 22:18

## 2021-01-01 RX ADMIN — MULTIPLE VITAMINS W/ MINERALS TAB 1 TABLET: TAB at 09:13

## 2021-01-01 RX ADMIN — HALOPERIDOL LACTATE 2 MG: 5 INJECTION, SOLUTION INTRAMUSCULAR at 01:42

## 2021-01-01 RX ADMIN — QUETIAPINE 25 MG: 25 TABLET, FILM COATED ORAL at 10:33

## 2021-01-01 RX ADMIN — APIXABAN 5 MG: 5 TABLET, FILM COATED ORAL at 21:35

## 2021-01-01 RX ADMIN — DEXTROSE, SODIUM CHLORIDE, SODIUM LACTATE, POTASSIUM CHLORIDE, AND CALCIUM CHLORIDE: 5; .6; .31; .03; .02 INJECTION, SOLUTION INTRAVENOUS at 10:42

## 2021-01-01 RX ADMIN — Medication 1000 MCG: at 08:50

## 2021-01-01 RX ADMIN — THIAMINE HYDROCHLORIDE 400 MG: 100 INJECTION, SOLUTION INTRAMUSCULAR; INTRAVENOUS at 09:41

## 2021-01-01 RX ADMIN — THIAMINE HYDROCHLORIDE 400 MG: 100 INJECTION, SOLUTION INTRAMUSCULAR; INTRAVENOUS at 21:22

## 2021-01-01 RX ADMIN — APIXABAN 5 MG: 5 TABLET, FILM COATED ORAL at 22:18

## 2021-01-01 RX ADMIN — METOPROLOL SUCCINATE 25 MG: 25 TABLET, EXTENDED RELEASE ORAL at 22:18

## 2021-01-01 RX ADMIN — QUETIAPINE 25 MG: 25 TABLET, FILM COATED ORAL at 14:13

## 2021-01-01 RX ADMIN — THIAMINE HYDROCHLORIDE 400 MG: 100 INJECTION, SOLUTION INTRAMUSCULAR; INTRAVENOUS at 13:22

## 2021-01-01 RX ADMIN — SULFAMETHOXAZOLE AND TRIMETHOPRIM 1 TABLET: 400; 80 TABLET ORAL at 10:33

## 2021-01-01 RX ADMIN — DEXTROSE, SODIUM CHLORIDE, AND POTASSIUM CHLORIDE: 5; .45; .15 INJECTION INTRAVENOUS at 22:45

## 2021-01-01 RX ADMIN — QUETIAPINE 25 MG: 25 TABLET, FILM COATED ORAL at 22:18

## 2021-01-01 RX ADMIN — METOPROLOL SUCCINATE 25 MG: 25 TABLET, EXTENDED RELEASE ORAL at 08:22

## 2021-01-01 RX ADMIN — CIPROFLOXACIN HYDROCHLORIDE 500 MG: 500 TABLET, FILM COATED ORAL at 08:53

## 2021-01-01 RX ADMIN — THIAMINE HYDROCHLORIDE 400 MG: 100 INJECTION, SOLUTION INTRAMUSCULAR; INTRAVENOUS at 09:12

## 2021-01-01 RX ADMIN — MULTIPLE VITAMINS W/ MINERALS TAB 1 TABLET: TAB at 18:09

## 2021-01-01 RX ADMIN — QUETIAPINE 12.5 MG: 25 TABLET, FILM COATED ORAL at 16:08

## 2021-01-01 RX ADMIN — HALOPERIDOL LACTATE 2 MG: 5 INJECTION, SOLUTION INTRAMUSCULAR at 02:13

## 2021-01-01 RX ADMIN — METOPROLOL SUCCINATE 25 MG: 25 TABLET, EXTENDED RELEASE ORAL at 10:32

## 2021-01-01 RX ADMIN — SULFAMETHOXAZOLE AND TRIMETHOPRIM 1 TABLET: 400; 80 TABLET ORAL at 22:18

## 2021-01-01 RX ADMIN — APIXABAN 5 MG: 5 TABLET, FILM COATED ORAL at 08:20

## 2021-01-01 RX ADMIN — APIXABAN 5 MG: 5 TABLET, FILM COATED ORAL at 21:14

## 2021-01-01 RX ADMIN — DONEPEZIL HYDROCHLORIDE 5 MG: 5 TABLET, FILM COATED ORAL at 21:40

## 2021-01-01 RX ADMIN — SODIUM CHLORIDE, PRESERVATIVE FREE 2 ML: 5 INJECTION INTRAVENOUS at 21:48

## 2021-01-01 RX ADMIN — SULFAMETHOXAZOLE AND TRIMETHOPRIM 1 TABLET: 400; 80 TABLET ORAL at 21:40

## 2021-01-01 RX ADMIN — THIAMINE HYDROCHLORIDE 400 MG: 100 INJECTION, SOLUTION INTRAMUSCULAR; INTRAVENOUS at 21:49

## 2021-01-01 RX ADMIN — MEMANTINE HYDROCHLORIDE 5 MG: 10 TABLET, FILM COATED ORAL at 08:23

## 2021-01-01 RX ADMIN — MULTIPLE VITAMINS W/ MINERALS TAB 1 TABLET: TAB at 17:34

## 2021-01-01 RX ADMIN — APIXABAN 5 MG: 5 TABLET, FILM COATED ORAL at 08:55

## 2021-01-01 RX ADMIN — METOPROLOL SUCCINATE 25 MG: 25 TABLET, EXTENDED RELEASE ORAL at 22:00

## 2021-01-01 RX ADMIN — QUETIAPINE 25 MG: 25 TABLET, FILM COATED ORAL at 08:44

## 2021-01-01 RX ADMIN — MEMANTINE HYDROCHLORIDE 5 MG: 10 TABLET, FILM COATED ORAL at 08:46

## 2021-01-01 RX ADMIN — APIXABAN 5 MG: 5 TABLET, FILM COATED ORAL at 20:44

## 2021-01-01 RX ADMIN — Medication 5000 UNITS: at 10:32

## 2021-01-01 RX ADMIN — Medication 5000 UNITS: at 08:18

## 2021-01-01 RX ADMIN — APIXABAN 5 MG: 5 TABLET, FILM COATED ORAL at 21:40

## 2021-01-01 RX ADMIN — QUETIAPINE 25 MG: 25 TABLET, FILM COATED ORAL at 21:14

## 2021-01-01 RX ADMIN — SODIUM CHLORIDE 1000 ML: 9 INJECTION, SOLUTION INTRAVENOUS at 19:51

## 2021-01-01 RX ADMIN — QUETIAPINE 25 MG: 25 TABLET, FILM COATED ORAL at 22:46

## 2021-01-01 RX ADMIN — SODIUM CHLORIDE, PRESERVATIVE FREE 2 ML: 5 INJECTION INTRAVENOUS at 21:40

## 2021-01-01 RX ADMIN — CEFTRIAXONE SODIUM 1000 MG: 100 INJECTION, POWDER, FOR SOLUTION INTRAVENOUS at 20:51

## 2021-01-01 RX ADMIN — DONEPEZIL HYDROCHLORIDE 5 MG: 5 TABLET, FILM COATED ORAL at 21:35

## 2021-01-01 RX ADMIN — METOPROLOL SUCCINATE 25 MG: 25 TABLET, EXTENDED RELEASE ORAL at 20:44

## 2021-01-01 RX ADMIN — DONEPEZIL HYDROCHLORIDE 5 MG: 5 TABLET, FILM COATED ORAL at 22:18

## 2021-01-01 RX ADMIN — MEMANTINE HYDROCHLORIDE 5 MG: 10 TABLET, FILM COATED ORAL at 08:56

## 2021-01-01 RX ADMIN — METOPROLOL SUCCINATE 25 MG: 25 TABLET, EXTENDED RELEASE ORAL at 21:14

## 2021-01-01 RX ADMIN — Medication 1000 MCG: at 08:21

## 2021-01-01 RX ADMIN — QUETIAPINE 25 MG: 25 TABLET, FILM COATED ORAL at 13:41

## 2021-01-01 RX ADMIN — CEFTRIAXONE SODIUM 1000 MG: 100 INJECTION, POWDER, FOR SOLUTION INTRAVENOUS at 21:17

## 2021-01-01 RX ADMIN — QUETIAPINE 25 MG: 25 TABLET, FILM COATED ORAL at 09:13

## 2021-01-01 RX ADMIN — ALBUTEROL SULFATE 2.5 MG: 2.5 SOLUTION RESPIRATORY (INHALATION) at 00:20

## 2021-01-01 RX ADMIN — QUETIAPINE 25 MG: 25 TABLET, FILM COATED ORAL at 14:25

## 2021-01-01 RX ADMIN — APIXABAN 5 MG: 5 TABLET, FILM COATED ORAL at 09:13

## 2021-01-01 RX ADMIN — MEMANTINE HYDROCHLORIDE 5 MG: 10 TABLET, FILM COATED ORAL at 09:13

## 2021-01-01 RX ADMIN — METOPROLOL SUCCINATE 25 MG: 25 TABLET, EXTENDED RELEASE ORAL at 08:46

## 2021-01-01 RX ADMIN — MULTIPLE VITAMINS W/ MINERALS TAB 1 TABLET: TAB at 08:49

## 2021-01-01 RX ADMIN — QUETIAPINE 25 MG: 25 TABLET, FILM COATED ORAL at 21:35

## 2021-01-01 RX ADMIN — THIAMINE HYDROCHLORIDE 400 MG: 100 INJECTION, SOLUTION INTRAMUSCULAR; INTRAVENOUS at 14:15

## 2021-01-01 RX ADMIN — APIXABAN 5 MG: 5 TABLET, FILM COATED ORAL at 08:46

## 2021-01-01 RX ADMIN — SODIUM CHLORIDE, PRESERVATIVE FREE 2 ML: 5 INJECTION INTRAVENOUS at 10:34

## 2021-01-01 RX ADMIN — THIAMINE HYDROCHLORIDE 400 MG: 100 INJECTION, SOLUTION INTRAMUSCULAR; INTRAVENOUS at 13:41

## 2021-01-01 ASSESSMENT — ENCOUNTER SYMPTOMS
EYES NEGATIVE: 1
CONFUSION: 1
COUGH: 0
BRUISES/BLEEDS EASILY: 0
ABDOMINAL DISTENTION: 0
EYE ITCHING: 0
EYE ITCHING: 0
WEAKNESS: 0
BRUISES/BLEEDS EASILY: 0
FATIGUE: 0
CONFUSION: 1
COUGH: 0
WEAKNESS: 0
EYE DISCHARGE: 0
CONSTIPATION: 0
BRUISES/BLEEDS EASILY: 0
EYE DISCHARGE: 0
ACTIVITY CHANGE: 1
LIGHT-HEADEDNESS: 0
COLOR CHANGE: 0
WEAKNESS: 0
COUGH: 0
LIGHT-HEADEDNESS: 0
CONSTIPATION: 0
VOMITING: 0
FEVER: 0
FEVER: 0
CONFUSION: 1
ACTIVITY CHANGE: 1
ABDOMINAL PAIN: 0
ABDOMINAL PAIN: 0
BRUISES/BLEEDS EASILY: 0
CONSTIPATION: 0
CHILLS: 0
CHILLS: 0
WEAKNESS: 0
CONSTIPATION: 0
EYE ITCHING: 0
COLOR CHANGE: 0
ABDOMINAL PAIN: 0
LIGHT-HEADEDNESS: 0
CONFUSION: 1
CONFUSION: 1
EYES NEGATIVE: 1
ACTIVITY CHANGE: 1
VOMITING: 0
CHILLS: 0
EYE ITCHING: 0
ABDOMINAL DISTENTION: 0
CONSTIPATION: 0
VOMITING: 0
LIGHT-HEADEDNESS: 0
COLOR CHANGE: 0
ACTIVITY CHANGE: 1
FEVER: 0
FATIGUE: 0
EYE DISCHARGE: 0
EYES NEGATIVE: 1
FATIGUE: 0
EYE DISCHARGE: 0
LIGHT-HEADEDNESS: 0
EYE ITCHING: 0
COUGH: 0
ABDOMINAL PAIN: 0
FEVER: 0
CHILLS: 0
FATIGUE: 0
FEVER: 0
ACTIVITY CHANGE: 1
WEAKNESS: 0
VOMITING: 0
EYE DISCHARGE: 0
COLOR CHANGE: 0
ABDOMINAL DISTENTION: 0
EYES NEGATIVE: 1
FATIGUE: 1
BRUISES/BLEEDS EASILY: 0
COUGH: 0
ABDOMINAL DISTENTION: 0
ABDOMINAL DISTENTION: 0
COLOR CHANGE: 0
FATIGUE: 0
ABDOMINAL PAIN: 0
CHILLS: 0
VOMITING: 0
EYES NEGATIVE: 1

## 2021-01-01 ASSESSMENT — COGNITIVE AND FUNCTIONAL STATUS - GENERAL
AFFECT: APPROPRIATE TO SITUATION;FLAT
DO YOU HAVE DIFFICULTY DRESSING OR BATHING: YES
ARE YOU BLIND OR DO YOU HAVE SERIOUS DIFFICULTY SEEING, EVEN WHEN WEARING GLASSES: NO
BECAUSE OF A PHYSICAL, MENTAL, OR EMOTIONAL CONDITION, DO YOU HAVE DIFFICULTY DOING ERRANDS ALONE: YES
BEHAVIOR: APPROPRIATE TO SITUATION
MOTOR_BEHAVIOR-RETARDATION_CALCULATED: CALM AND PURPOSEFUL
MOOD: UNREMARKABLE;FLAT
BASIC_MOBILITY_CONVERTED_SCORE: 42.48
BASIC_MOBILITY_RAW_SCORE: 19
MEMORY: UNABLE TO ASSESS
BASIC_MOBILITY_CONVERTED_SCORE: 30.25
AFFECT: APPROPRIATE TO SITUATION
BASIC_MOBILITY_RAW_SCORE: 11
ORIENTATION: DISORIENTED TO PLACE;DISORIENTED TO TIME
ARE YOU DEAF OR DO YOU HAVE SERIOUS DIFFICULTY  HEARING: NO
MOTOR_BEHAVIOR-AGITATION_CALCULATED: CALM AND PURPOSEFUL
LEVEL_OF_CONSCIOUSNESS_CALCULATED: ALERT
SPEECH: WEAK VOICE;BREATHY VOICE
SPEECH: WEAK VOICE;BREATHY VOICE
MOTOR_BEHAVIOR-AGITATION_CALCULATED: CALM AND PURPOSEFUL
BASIC_MOBILITY_RAW_SCORE: 14
MEMORY: UNABLE TO ASSESS
BEHAVIOR: APPROPRIATE TO SITUATION
BECAUSE OF A PHYSICAL, MENTAL, OR EMOTIONAL CONDITION, DO YOU HAVE SERIOUS DIFFICULTY CONCENTRATING, REMEMBERING OR MAKING DECISIONS: YES
MOOD: UNREMARKABLE
ORIENTATION: DISORIENTED TO PLACE;DISORIENTED TO TIME
ATTENTION_CALCULATED: UNABLE TO ASSESS
LEVEL_OF_CONSCIOUSNESS_CALCULATED: ALERT
MOTOR_BEHAVIOR-RETARDATION_CALCULATED: CALM AND PURPOSEFUL
BASIC_MOBILITY_CONVERTED_SCORE: 35.55
DO YOU HAVE SERIOUS DIFFICULTY WALKING OR CLIMBING STAIRS: YES
ATTENTION_CALCULATED: UNABLE TO ASSESS

## 2021-01-01 ASSESSMENT — ACTIVITIES OF DAILY LIVING (ADL)
CHRONIC_PAIN_PRESENT: NO
CONTINENCE: NEEDS ASSISTANCE
TOILETING: NEEDS ASSISTANCE
RECENT_DECLINE_ADL: YES, ACUTE ILLNESS WITHOUT THERAPY NEEDS
ADL_BEFORE_ADMISSION: NEEDS/REQUIRES ASSISTANCE
BATHING: NEEDS ASSISTANCE
ADL_SHORT_OF_BREATH: NO
TRANSFERRING: NEEDS ASSISTANCE
DRESSING YOURSELF: NEEDS ASSISTANCE
ADL_SCORE: 6
MOBILITY_ASSIST_DEVICES: STANDARD WALKER
FEEDING YOURSELF: NEEDS ASSISTANCE

## 2021-01-01 ASSESSMENT — PAIN SCALES - PAIN ASSESSMENT IN ADVANCED DEMENTIA (PAINAD)
FACIALEXPRESSION: SMILING OR INEXPRESSIVE
BREATHING: NORMAL
CONSOLABILITY: NO NEED TO CONSOLE
CONSOLABILITY: DISTRACTED OR REASSURED BY VOICE OR TOUCH
BODYLANGUAGE: RELAXED
BODYLANGUAGE: RELAXED
TOTALSCORE: 0
BREATHING: NORMAL
TOTALSCORE: 1
FACIALEXPRESSION: SMILING OR INEXPRESSIVE
TOTALSCORE: 0
CONSOLABILITY: NO NEED TO CONSOLE
BREATHING: NORMAL
CONSOLABILITY: NO NEED TO CONSOLE
CONSOLABILITY: DISTRACTED OR REASSURED BY VOICE OR TOUCH
BODYLANGUAGE: RELAXED
CONSOLABILITY: DISTRACTED OR REASSURED BY VOICE OR TOUCH
FACIALEXPRESSION: SMILING OR INEXPRESSIVE
FACIALEXPRESSION: SMILING OR INEXPRESSIVE
BREATHING: NORMAL
BODYLANGUAGE: RELAXED
CONSOLABILITY: NO NEED TO CONSOLE
BREATHING: NORMAL
FACIALEXPRESSION: SMILING OR INEXPRESSIVE
BODYLANGUAGE: RELAXED
FACIALEXPRESSION: SMILING OR INEXPRESSIVE
TOTALSCORE: 0
TOTALSCORE: 1
BODYLANGUAGE: RELAXED
BREATHING: NORMAL
BODYLANGUAGE: RELAXED
FACIALEXPRESSION: SMILING OR INEXPRESSIVE
BREATHING: NORMAL
TOTALSCORE: 1
TOTALSCORE: 0

## 2021-01-01 ASSESSMENT — LIFESTYLE VARIABLES
HOW OFTEN DO YOU HAVE A DRINK CONTAINING ALCOHOL: NEVER
ALCOHOL_USE_STATUS: NO OR LOW RISK WITH VALIDATED TOOL
AUDIT-C TOTAL SCORE: 0
HOW MANY STANDARD DRINKS CONTAINING ALCOHOL DO YOU HAVE ON A TYPICAL DAY: 0,1 OR 2
HOW OFTEN DO YOU HAVE 6 OR MORE DRINKS ON ONE OCCASION: NEVER

## 2021-01-01 ASSESSMENT — PAIN SCALES - GENERAL
PAINLEVEL_OUTOF10: 0

## 2021-01-01 ASSESSMENT — PULMONARY FUNCTION TESTS: FEV1/FVC: UNABLE TO OBTAIN, OR GREATER THAN 70%

## 2021-01-12 NOTE — PROGRESS NOTES
Neurology Initial Visit     Referred By: Dr. Neri ref. provider found    Chief Complaint: Patient presents with:  Memory Loss: Patient presents today with wife and daughter who state that patient's memory has been declining since last January.   Wife states differentiated adenocarcinoma in background of tubular adenoma         Past Surgical History:   Procedure Laterality Date   • COLONOSCOPY  09-   • ELECTROCARDIOGRAM, COMPLETE  03-    Scanned to media tab - DOS 03-   • HERNIA SURGERY  1 Mental Status- Alert, significant decline cognitively, does not know the year or the month, no simple items. But not able to answer complicated questions or simple math. .    Cranial Nerves:    III, IV, VI- EOM intact, TIFFANI  V.  Facial sensation intact Dolores 23; Future  - TSH W REFLEX TO FREE T4; Future  - VITAMIN B12; Future  - URINALYSIS WITH CULTURE REFLEX; Future           Education and counseling provided to patient.  Instructed patient to call

## 2021-01-12 NOTE — TELEPHONE ENCOUNTER
----- Message from Shandra Marcial MD sent at 1/12/2021 12:52 PM CST -----  Please let the patient know that results of these particular lab tests so far were normal.    Thank you

## 2021-01-15 NOTE — TELEPHONE ENCOUNTER
Juana from [facility] is calling with results   INR 2.3   PT 27.3  Pt. Takes 2.5 mg of coumadin five days a week and 1.25mg of coumadin on Sunday and Wednesday   Pt.  Was discharged from home care today  Ph. # [phone]   Routed high to [facility]

## 2021-01-15 NOTE — TELEPHONE ENCOUNTER
Maria Victoria Deshpande is calling because Magalene Babinski will be moved to [facility] care he needs an order for a COVID19 test and tb blood test seven days before his move in date.  They have paperwork thet needs to be completed they would like to drop this off Monday ph.

## 2021-01-15 NOTE — TELEPHONE ENCOUNTER
Patient/ wife/ caregiver not available; LMVM; LMTCB    D/w dgtr Alex Polanco; pt will be transferred to [facility] memory care later this month; pt has Alzheimer's disease as noted at last visit with Dr Erick Guerrero    dgtr dose not want new Rx at this time
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
## 2021-01-15 NOTE — TELEPHONE ENCOUNTER
Spoke to MELINA Bhatti CPM coumadin; Pt will be going to facility by the end of the month so there will be a different team taking care of his Tx;   No changes for now
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
## 2021-01-15 NOTE — TELEPHONE ENCOUNTER
Nares COVID test, and quantiferon gold TB test ordered; wife may call to schedule 1 week before move in date;  please call pt's wife

## 2021-01-18 NOTE — TELEPHONE ENCOUNTER
Daughter in law Ashley Tabor called   Wants to be sure medications prescribed by   Dr Brandon Paul [facility] and   Dr Josephine Villafana specialist are included in medication list for memory care paperwork    Any questions please call Ashley Tabor [phone]

## 2021-01-21 NOTE — TELEPHONE ENCOUNTER
Pt had TB & Covid test today @[facility]  When available /Needs results faxed to [phone]   Attn: Alem Baxter   for memory care admittance

## 2021-01-22 NOTE — TELEPHONE ENCOUNTER
Please call Annel Cline  Has information been faxed to [facility], information in packet?   Pt move in date is next Thursday 1/28/21, need by Wednesday  Tasked to nursing

## 2021-01-24 PROBLEM — R41.0 DELIRIUM: Status: ACTIVE | Noted: 2021-01-01

## 2021-01-24 PROBLEM — R46.89 AGGRESSIVE BEHAVIOR: Status: ACTIVE | Noted: 2021-01-01

## 2021-01-24 PROBLEM — R79.89 AZOTEMIA: Status: ACTIVE | Noted: 2021-01-01

## 2021-01-24 PROBLEM — R73.9 HYPERGLYCEMIA: Status: ACTIVE | Noted: 2021-01-01

## 2021-01-25 PROBLEM — F02.81 LATE ONSET ALZHEIMER'S DISEASE WITH BEHAVIORAL DISTURBANCE (HCC): Status: ACTIVE | Noted: 2021-01-01

## 2021-01-25 PROBLEM — F02.818 LATE ONSET ALZHEIMER'S DISEASE WITH BEHAVIORAL DISTURBANCE (HCC): Status: ACTIVE | Noted: 2021-01-01

## 2021-01-25 PROBLEM — G30.1 LATE ONSET ALZHEIMER'S DISEASE WITH BEHAVIORAL DISTURBANCE (HCC): Status: ACTIVE | Noted: 2021-01-01

## 2021-01-25 PROBLEM — C34.11 MALIGNANT NEOPLASM OF UPPER LOBE OF RIGHT LUNG (HCC): Status: ACTIVE | Noted: 2021-01-01
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
## 2021-01-25 NOTE — ED NOTES
Writer spoke with wife and daughter of the pt. Family was requesting to be contacted with any updates on the pts status.    Renate Damon cell [phone]

## 2021-01-25 NOTE — ED NOTES
Orders for admission, patient is aware of plan and ready to go upstairs. Any questions, please call ED RN Cesario Lynch  at extension [phone].    Type of COVID test sent:rapid  COVID Suspicion level: Low    Titratable drug(s) infusing:none  Rate:    LOC at time of tr
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
## 2021-01-25 NOTE — H&P
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   • Basal cell carcinoma 2016    right posterior auricular   • DVT, lower extremity ([facility]     LLE   • Gout 2014    right hand middle finger   • Lipid screening 05-   • Pulmonary embolus ([facility] 2013    med fatigue  ENMT:  Negative for ear drainage, hearing loss and nasal drainage  Eyes:  Negative for eye discharge and vision loss  Cardiovascular:  Negative for chest pain; negative palpitations  Respiratory:  Negative for cough, dyspnea and wheezing  Endocrin 12.2*   HCT 38.8*   MCV 97.0   MCH 30.5   MCHC 31.4   RDW 15.2*   NEPRELIM 4.55   .0         Imaging:  Ct Brain Or Head ([id number])    Result Date: 1/24/2021  CONCLUSION:  1. No intracranial hemorrhage, focal infarct or mass.   Moderate chronic appearing surgery after evaluation by CV surgeon, Dr Parker Stroud at [facility] also was not thought to be an excellent candidate for a biopsy based upon the potential risk and the high clinical suspicion that this represented a lung tumor.  Patient was seen in c [facility]); pt had flex-sig on 4/18/17 by Dr Darcy Gil; biopsy negative for invasive carcinoma; pt had repeat flex-sig on 3/20/19; JACK; PET/CT on 3/14/19 showed nonspecific 14 x 18 mm diameter FDG avid focus within the posterior lumen of the upper rectum ap
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
## 2021-01-25 NOTE — PLAN OF CARE
Patient admitted to unit, oriented to room and call light system. Pt reoriented to situation throughout the night. Pt alert and oriented to person, on room air. Med rec complete. History of DVT/PE, on coumadin. Pt did not complain of pain.  Regular general fall injury  Description: INTERVENTIONS:  - Assess pt frequently for physical needs  - Identify cognitive and physical deficits and behaviors that affect risk of falls.   - [place] fall precautions as indicated by assessment.  - Educate pt/family on patie

## 2021-01-25 NOTE — CM/SW NOTE
The pt has a planned admission to [facility] memory care unit on Tuesday. The pt was evaluated by neurology on 1-12-21.

## 2021-01-25 NOTE — PLAN OF CARE
Patient is alert to self, does not call appropriately for needs  This AM patient was pleasantly confused, attempting to get out of bed, redirected several times. Safety reinforced to patient per multiple staff responding to bed alarm going off.  This aftern Madison Mejia, RN  Outcome: Progressing     Problem: RISK FOR INFECTION - ADULT  Goal: Absence of fever/infection during anticipated neutropenic period  Description: INTERVENTIONS  - Monitor WBC  - Administer growth factors as ordered  - Implement neutropen ability or social support system  1/25/2021 1439 by James Gilmore RN  Outcome: Progressing  1/25/2021 1439 by James Gilmore RN  Outcome: Progressing

## 2021-01-25 NOTE — CONSULTS
[facility]  [facility], [address] [place] [address]  [phone]          [address] [facility]    Report of Consultation    Anna Gonzalez Patient Status:  Observation •  Misc Natural Products (TOTAL MEMORY & FOCUS FORMULA) Oral Tab, Take 1 tablet by mouth daily. , Disp: , Rfl:     •  cyanocobalamin 1000 MCG Oral Tab, Take 1 tablet (1,000 mcg total) by mouth daily. , Disp: 1 tablet, Rfl: 0         MEDICAL HISTORY  Past Med Blood clot in leg   • Glaucoma Neg    • Macular degeneration Neg    • Diabetes Neg        ALLERGIES  No Known Allergies    ? REVIEW OF SYSTEMS:   Could not obtain - patient not able to verbalize details of ROS or HPI     ? PHYSICAL EXAM:   /72 (BP 1. No intracranial hemorrhage, focal infarct or mass. Moderate chronic appearing deep white matter ischemic change in the periventricular white matter bilaterally. I PERSONALLY REVIEWED THESE IMAGES AND AGREE WITH THE IMPRESSION.      ASSESSMENT:  The pat

## 2021-01-26 NOTE — PROGRESS NOTES
Discharge RN Summary: Patient has discharge order in. Patient to discharge to [facility]. IV to be removed by primary RN. RN to call report to facility. Discharge packet prepared for transfer.

## 2021-01-26 NOTE — PLAN OF CARE
Alert X1,cofused  Problem: Patient Centered Care  Goal: Patient preferences are identified and integrated in the patient's plan of care  Description: Interventions:  - What would you like us to know as we care for you?  I live at home with my wife  - Provid assessment  - Modify environment to reduce risk of injury  - Provide assistive devices as appropriate  - Consider OT/PT consult to assist with strengthening/mobility  - Encourage toileting schedule  Outcome: Progressing     Problem: CONFUSION  Goal: Confus threat to safety call refer to organization policy.  - Initiate consult with , Psychosocial CNS, Spiritual Care as appropriate  Outcome: Progressing     Problem: Impaired Cognition  Goal: Patient will exhibit improved attention, thought proces

## 2021-01-26 NOTE — PROGRESS NOTES
[facility]    Progress Note    Kyleigh Mcmahan Patient Status:  Observation    1926 MRN [id number]   Location [facility] 5SW/SE Attending John Silverman MD   Hosp Day # 0 PCP Sinai Cohen MD     Subjective:   Selene Burroughs Mos Changes  Heme/Lymph: Bruising, Bleeding, Lymphadenopathy  Endocrine: Polyuria, Polydipsia, Temperature Intolerance    Objective:   Vital Signs:  Blood pressure 142/75, pulse 57, temperature 97.3 °F (36.3 °C), temperature source Oral, resp.  rate 18, weight Plan:   Assessment and Plan:     Alzheimer's dementia with behavioral disturbance  Has had increasing anxiety; had started donepezil 5 mg po at bedtime on 1/12/21; patient had a previous planned admittance to [facility] unit tomorrow; derian scan of the chest on 05/13/2020 demonstrated that the treated multilobulated 2.9 cm right upper lobe nodule appears similar in size when compared to the scan from August 2019 and decreased in size from its original 3.9 cm.  There was no hilar or mediastina 2019; advise F/U Dr Bayron Rowe and Dr Scott Angulo; CEA 2.6 in Aug 2019     Recurrent DVT/pulmonary embolus  INR 2.9 today per [facility] Coumad[facility]; on warfarin 1.25 mg Wed, Sun, and  warfarin 2.5 mg po qHS all other days; warfarin per [facility] coumadin clinic  - C/w cu

## 2021-01-26 NOTE — CM/SW NOTE
MDO for kwame. ROXANA confirmed with Oleg Girard at [facility] that family is moving in belongings now. They can accept at 2pm    CM confirmed above with wife . Wife req medicar transport. Quote was provided at $48. Wife is agreeable.     PLAN  [facility] 2pm

## 2021-01-26 NOTE — DISCHARGE SUMMARY
[facility]    Discharge Summary    Reyes Connor Patient Status:  Observation    1926 MRN [id number]   Location [facility] 5SW/SE Attending Heather Arthur MD   Hosp Day # 0 PCP Petra Gutierrez MD     Date of Admission:  Coumadin), CKD who originally presented  For increasing wandering and behavioral disturbance. Has been awaiting admittance to memory care unit at [facility]. Admitted for medical management as well as placement.   Work-up was unremarkable without medica the patient is a nonsmoker, the clinical appearance of the lung was highly suspicious for malignancy, and he was not thought to be a good candidate for surgery after evaluation by CV surgeon, Dr Link Alvarez at [facility] also was not thought to be an e perhaps related to prior PE; EF 75% with +LVH on ECHO;      Rectal cancer  Completed concurrent Xeloda/XRT in Sept 2016 under care of Chase Hernnadez, and oncologist Dr Handy (previouslyDr Kaley Palafox); pt had flex-sig on 4/18/17 by Dr Brennan Fang; biopsy negative Tab  Take 1 tablet (5 mg total) by mouth nightly.     Warfarin Sodium 2.5 MG Oral Tab  TAKE 1 TABLET BY MOUTH EVERY EVENING AS DIRECTED BY COUMADIN CLINIC    METOPROLOL TARTRATE 25 MG Oral Tab  TAKE 1 TABLET BY MOUTH TWICE DAILY( BETA BLOCKER)    [place]

## 2021-01-26 NOTE — PROGRESS NOTES
Bedside shift report given to night RN. Per MD request, Coumadin clarification passed along to night RN. See MAR for administration instruction.

## 2021-01-26 NOTE — PLAN OF CARE
Alert to self,forgetful.vital signs are stable.high fall risk ,ambulating with walker /1 assist,pt took his medication with crush /apples sauce. placed bed at lower position and bed alarm remain active. plan to discharge today  to [facility] .   Prob INTERVENTIONS  - Monitor WBC  - Administer growth factors as ordered  - Implement neutropenic guidelines  1/26/2021 0433 by Lilliana Garg, RN  Outcome: Progressing  1/26/2021 0159 by Lilliana Garg, RN  Outcome: Progressing     Problem: SAFETY ADULT - Progressing     Problem: CONFUSION  Goal: Confusion, delirium, dementia or psychosis is improved or at baseline  Description: INTERVENTIONS:  - Assess for possible contributors to thought disturbance, including medications, impaired vision or hearing, unde CNS, Spiritual Care as appropriate  1/26/2021 0433 by Oscar Andrews RN  Outcome: Progressing  1/26/2021 0159 by Oscar Andrews RN  Outcome: Progressing     Problem: Impaired Cognition  Goal: Patient will exhibit improved attention, thought processin

## 2021-01-26 NOTE — SPIRITUAL CARE NOTE
Pt was in good spirit, conversational and friendly. Due to his Alzheimer disease, he could not explain why he was here, but spoke pieces of his life, he wants to go outside for a walk, and go home.  provided supportive conversation.   Radha Berg ch

## 2021-01-26 NOTE — PLAN OF CARE
Patient is Alert to person. He can be forgetful and confused. He follows commands. Room air. Incontinence care provided. Stand by assist with a walker. Good appetite. IV saline locked.  Patient cleared for discharge today, he will be transferred to [facility] injury  Description: INTERVENTIONS:  - Assess pt frequently for physical needs  - Identify cognitive and physical deficits and behaviors that affect risk of falls.   - [place] fall precautions as indicated by assessment.  - Educate pt/family on patient sa direction  - Decrease environmental stimuli, including noise as appropriate  - Monitor and intervene to maintain adequate nutrition, hydration, elimination, sleep and activity  - Consider the need for a sitter if unable to leave patient unattended  - Initi

## 2021-01-29 NOTE — TELEPHONE ENCOUNTER
Please FAX Rx for metoprolol 25 mg po BID #180, 3RF and Vitamin D 2000 international units  Po every day #90, 3 RF

## 2021-01-29 NOTE — TELEPHONE ENCOUNTER
Please advise on Vitamin D dosage  Supposed to take 2000 Units , family provided 5000 Units . Called Helmut Ganser /[facility] and explaind that patient still has refills at [place] for Metoprolol Tartrate 25mg BID .  If he needs it send to [facility] we need to kn

## 2021-01-29 NOTE — TELEPHONE ENCOUNTER
Juan Antonio/[facility] called    Please send Escript to [facility] for  Metoprolol tartrate 25 mg   Takes 1 tbalet 2X day  Fax# [phone]    Patient is supposed to take Vitamin D 50 mcg capsule/2000 units     Family provided 125 mcg/5000 units    Please raul

## 2021-02-02 NOTE — TELEPHONE ENCOUNTER
Received a fax from [address] [place] [address] with patient's PT INR results.      PT - 33.2  INR - 3.0    Routed to

## 2021-02-02 NOTE — TELEPHONE ENCOUNTER
Aniceto Carter from [facility] is calling to find out which [facility] agency the pt. Is currently using? They still show an \"open episode\" with a [facility] agency. Aniceto Carter can be reached at [phone].

## 2021-02-02 NOTE — TELEPHONE ENCOUNTER
Called YANET from [facility] and explained that in November [facility] was ordered - no encounters now. BODØ already spoke with 1 daughter and she doesn,t now which [facility] they are using .  BODØ will reach out to [facility]

## 2021-02-12 NOTE — TELEPHONE ENCOUNTER
[facility] calling to inform Dr Alexandre Waters that the patient fell this morning at 10:30am. No injuries. Patient did not hit his head.      Ph # [phone]

## 2021-02-12 NOTE — TELEPHONE ENCOUNTER
To Dr. Jazmine Oneill to please advise----  Spoke to nurse Jennifer Walton at [facility]. Reports pt suffered an observed fall at 10:30AM today in the hallway on hardwood floor.  Fall was observed by pt's caregiver who reported that pt fell backwards landing on buttocks, denies

## 2021-02-17 NOTE — TELEPHONE ENCOUNTER
Juan Antonio/[facility] called   Lab cannot come today for PT/INR will come tomorrow  Tasked to Coumadin as ke

## 2021-02-18 NOTE — TELEPHONE ENCOUNTER
Received PT/INR results from 2/18/2021    PT 25.4  INR 2.2    Copy placed on Shanon's desk  Tasked to coumadin high

## 2021-02-19 NOTE — TELEPHONE ENCOUNTER
To Dr. Jaspal Jenkins----    Spoke to nurse Reji Banda at [facility]. States patient fell at 10:40 today. Fall was unwitnessed, found pt sitting on floor in family room. Pt denies hitting head.  Most recent vital signs from around 12:30:   /81  HR 71  RR 18  SpO2 94%

## 2021-02-19 NOTE — TELEPHONE ENCOUNTER
[facility] calling to inform Dr Ly Hale that the patient fell this morning around 10:40am. Patient denies hitting his head. Two small abrasions to his back, no active bleeding.    BP - 112/81  Pulse - 77  Respiratory - 18  Temperature - 98.1  O2 stat -

## 2021-03-09 NOTE — TELEPHONE ENCOUNTER
Please call [phone] [facility] [facility]  Pt becoming combative and agitated, wasn't like that when he first moved in  Last night and this morning, unable to redirect  Please call to discuss/advise  Tasked to nursing

## 2021-03-09 NOTE — TELEPHONE ENCOUNTER
To Dr. Bar Jensen to relay Dr. Ramos Lee' message. She said he was too combative - she called 911 he will be taken to [facility]. Dr. Ramos Lee' message never relayed.

## 2021-03-09 NOTE — ED NOTES
Patient cleared for discharge by MD. Report given to [facility]. Spoke with Halle Momin at [facility] regarding patients return. Discharge paperwork given to medics. Patient baseline upon discharge.
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
## 2021-03-09 NOTE — TELEPHONE ENCOUNTER
Rx printed for Seroquel 25 mg po at bedtime #90, 3 RF  Rx printed for donepezil 5 mg po qDF #90, 3 RF

## 2021-03-09 NOTE — ED PROVIDER NOTES
Patient Seen in: [facility] AND [facility] Emergency Department      History   Patient presents with:  Altered Mental Status    Stated Complaint: altered mental status    HPI/Subjective:   HPI    28-year-old male with history of dementia, brought in by EMS after Genitourinary: Negative for dysuria. Musculoskeletal: Negative for back pain. Skin: Negative for rash. Neurological: Negative for dizziness. Psychiatric/Behavioral: Negative for suicidal ideas. All other systems reviewed and are negative. Urine Negative Negative mg/dL    Blood Urine Negative Negative    pH Urine 5.0 5.0 - 8.0    Protein Urine Negative Negative mg/dL    Urobilinogen Urine <2.0 <2.0    Nitrite Urine Negative Negative    Leukocyte Esterase Urine Negative Negative    WBC Urine obtaining the patient's history, performing the physical exam and reviewing the diagnostics, multiple initial diagnoses were considered based on the presenting problem including dementia, sepsis, UTI.                          Disposition and Plan     Clinic
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
## 2021-04-12 ENCOUNTER — TELEPHONE (OUTPATIENT)
Dept: INTERNAL MEDICINE CLINIC | Facility: CLINIC | Age: 86
End: 2021-04-12

## (undated) NOTE — IP AVS SNAPSHOT
Patient Demographics     Address  [address] [place] [address] [id number] Danni Becerra [address] Phone  [phone] [facility])  [phone] (Work)  [phone] (Mobile) *Preferred* E-mail Address  [email or web address]      Emergency Contact(s)     Name Relation Home Work Mo Next dose due: Take this evening      TAKE 1 TABLET BY MOUTH TWICE DAILY( BETA BLOCKER)   Andressa Neal MD         PreserVision AREDS Tabs  Next dose due: Take this evening      Take 1 tablet by mouth 2 (two) times daily.           QUEtiapine Fumarate 2 PROTHROMBIN TIME (PT) [[id number]] (Abnormal)  Resulted: 01/26/21 0709, Result status: Final result   Ordering provider: Patrick Gomez MD  01/25/21 2300 Resulting lab: [facility])    Specimen Information    Type [facility] Henriquevicki Erickson Patient Status:  Observation    1926 MRN [id number]   Location [facility] 5SW/SE Attending Lizzie Aparicio MD   Hosp Day # 0 PCP Karri Grullon MD     Date:  2021  Date of Admission:  2021     History of Present I • Basal cell carcinoma 2016    right posterior auricular   • DVT, lower extremity ([facility] [address]     LLE   • Gout 2014    right hand middle finger   • Lipid screening 05-   • Pulmonary embolus [facility]) 2013    medical management   • Rectal cancer ([facility] [address] ENMT:  Negative for ear drainage, hearing loss and nasal drainage  Eyes:  Negative for eye discharge and vision loss  Cardiovascular:  Negative for chest pain; negative palpitations  Respiratory:  Negative for cough, dyspnea and wheezing  Endocrine:  Negat HCT 38.8*   MCV 97.0   MCH 30.5   MCHC 31.4   RDW 15.2*   NEPRELIM 4.55   .0         Imaging:  Ct Brain Or Head ([id number])    Result Date: 1/24/2021  CONCLUSION:  1. No intracranial hemorrhage, focal infarct or mass.   Moderate chronic appearing deep wh CT chest in [place] in Feb 2019 showed a 3.7 cm right upper lobe lung mass.  [place] came back to [place] and was evaluated with a PET-CT on 03/14/2019, which showed a hypermetabolic 3.9 cm solid focus within the right upper lobe thought highly suspicious for head CT shows no acute intracranial process, mild microvascular white matter ischemic changes noted, likely related to long-standing hypertension and/or diabetes; also seen is atherosclerosis in the anterior and posterior circulations     Aortic stenosis Electronically signed by German Reilly MD on 1/25/2021  1:34 PM   Attribution [employer]    WO.1 - German Reilly MD on 1/25/2021  1:07 PM                        Consults - MD Consult Notes      Consults signed by Hillary Russell DO at 1/25/2021  1:46 P •  Donepezil HCl 5 MG Oral Tab, Take 1 tablet (5 mg total) by mouth nightly., Disp: 90 tablet, Rfl: 3    •  Warfarin Sodium 2.5 MG Oral Tab, TAKE 1 TABLET BY MOUTH EVERY EVENING AS DIRECTED BY COUMADIN CLINIC, Disp: 90 tablet, Rfl: 0    •  METOPROLOL TARTR Highest education level: Not on file    Tobacco Use      Smoking status: Never Smoker      Smokeless tobacco: Never Used    Substance and Sexual Activity      Alcohol use: No        Alcohol/week: 0.0 standard drinks      Drug use: No    Other Topics C7 Hip flexion (Iliopsoas) L2-4  Knee flexion  (Hamstrings)  L5-S1   Knee Extension (Quadriceps)  L3, L4  Ankle Dorsiflexion  Tib Anterior   L5 Ankle   Plantarflexion  Gastroc, Soleus  S1   R 5 5 5 5 5 5 5 5 5 5   L 5 5 5 5 5 5 5 5 5 5       Reflexes: UE a Staff Neurologist   1/25/2021  7:17 AM[SS.1]                  Electronically signed by Donelle Scheuermann, DO on 1/25/2021  1:46 PM   Attribution [employer]    SS. 1 - Donelle Scheuermann, DO on 1/25/2021  7:17 AM  SS.2 - Donelle Scheuermann, DO on 1/25/2021  1:20 PM

## (undated) NOTE — IP AVS SNAPSHOT
[facility]            (For Outpatient Use Only) Initial Admit Date: 1/24/2021   Inpt/Obs Admit Date: Inpt: N/A / Obs: 01/24/21   Discharge Date:    Annette Joe:  [de-identified]   MRN: [de-identified]   CSN: [id number]   CEID: [id number]        EN Group Number:  Insurance Type:    Subscriber Name:  Subscriber :    Subscriber ID:  Pt Rel to Subscriber:    Hospital Account Financial Class: Medicare    2021

## (undated) NOTE — MR AVS SNAPSHOT
FRIDA Anza  [address] [place] [address]  [phone]               Thank you for choosing us for your health care visit with Ginny Ferraro MD.  We are glad to serve you and happy to provide you with this summary of your visit.   Ple You can get these medications from any pharmacy     Bring a paper prescription for each of these medications    - azithromycin 250 MG Tabs            [employer]     Call the [employer] for assistance with your inactive [employer] account    If you have questions,

## (undated) NOTE — MR AVS SNAPSHOT
[address]  [phone]               Thank you for choosing us for your health care visit with Salil V. Kitty Canavan, MD.  We are glad to serve you and happy to provide you with this summar medicine and wait a few minutes. Grasp the outer ear and wiggle it to help the solution enter the canal.  · Lean over a sink or basin with the blocked ear turned downward.  Use a rubber bulb syringe filled with warm (not hot or cold) water to rinse the ear BP Temp Height Weight BMI    123/68 mmHg 97 °F (36.1 °C) (Tympanic) 6' (1.829 m) 180 lb (81.647 kg) 24.41 kg/m2         Current Medications          This list is accurate as of: 3/14/17  3:12 PM.  Always use your most recent med list.                Co Q Aerobic physical activity Regular aerobic physical activity (e.g., brisk walking, light jogging, cycling, swimming, etc.) for a goal of at least 150 minutes per week. Moderation of alcohol consumption Men: limit to <= 2 drinks* per day.   Women and lighte